# Patient Record
Sex: MALE | Race: WHITE | NOT HISPANIC OR LATINO | URBAN - METROPOLITAN AREA
[De-identification: names, ages, dates, MRNs, and addresses within clinical notes are randomized per-mention and may not be internally consistent; named-entity substitution may affect disease eponyms.]

---

## 2018-05-31 ENCOUNTER — OUTPATIENT (OUTPATIENT)
Dept: OUTPATIENT SERVICES | Facility: HOSPITAL | Age: 73
LOS: 1 days | End: 2018-05-31
Payer: COMMERCIAL

## 2018-05-31 ENCOUNTER — APPOINTMENT (OUTPATIENT)
Dept: RADIOLOGY | Facility: HOSPITAL | Age: 73
End: 2018-05-31
Payer: MEDICARE

## 2018-05-31 DIAGNOSIS — Z00.8 ENCOUNTER FOR OTHER GENERAL EXAMINATION: ICD-10-CM

## 2018-05-31 PROCEDURE — 73562 X-RAY EXAM OF KNEE 3: CPT | Mod: 26,LT

## 2018-05-31 PROCEDURE — 73562 X-RAY EXAM OF KNEE 3: CPT

## 2021-02-04 ENCOUNTER — NON-APPOINTMENT (OUTPATIENT)
Age: 76
End: 2021-02-04

## 2021-02-04 ENCOUNTER — APPOINTMENT (OUTPATIENT)
Dept: CARDIOLOGY | Facility: CLINIC | Age: 76
End: 2021-02-04
Payer: MEDICARE

## 2021-02-04 VITALS
TEMPERATURE: 96.1 F | SYSTOLIC BLOOD PRESSURE: 122 MMHG | HEIGHT: 62 IN | BODY MASS INDEX: 27.42 KG/M2 | DIASTOLIC BLOOD PRESSURE: 79 MMHG | WEIGHT: 149 LBS | RESPIRATION RATE: 16 BRPM | HEART RATE: 71 BPM | OXYGEN SATURATION: 100 %

## 2021-02-04 VITALS — HEART RATE: 64 BPM | DIASTOLIC BLOOD PRESSURE: 80 MMHG | SYSTOLIC BLOOD PRESSURE: 110 MMHG

## 2021-02-04 DIAGNOSIS — E78.5 HYPERLIPIDEMIA, UNSPECIFIED: ICD-10-CM

## 2021-02-04 DIAGNOSIS — I10 ESSENTIAL (PRIMARY) HYPERTENSION: ICD-10-CM

## 2021-02-04 DIAGNOSIS — I49.3 VENTRICULAR PREMATURE DEPOLARIZATION: ICD-10-CM

## 2021-02-04 PROCEDURE — 93306 TTE W/DOPPLER COMPLETE: CPT

## 2021-02-04 PROCEDURE — 99204 OFFICE O/P NEW MOD 45 MIN: CPT

## 2021-02-04 PROCEDURE — 93000 ELECTROCARDIOGRAM COMPLETE: CPT

## 2021-02-04 PROCEDURE — 99072 ADDL SUPL MATRL&STAF TM PHE: CPT

## 2021-02-04 NOTE — REASON FOR VISIT
[Consultation] : a consultation regarding [FreeTextEntry1] : This is a 75-year-old man with a long history of premature ventricular contractions which he says dates back to  when he was hospitalized for diverticulitis. The patient has a history of hypertension. He has a history of hyperlipidemia. He has no history of diabetes he is a nonsmoker He drinks one glass of wine per day and one cup of coffee per day. Most of this and blood work reveals total cholesterol 167 HDL 59 LDL 86. He has a family history of a mother who  from kidney disease at age 53 his father  of an MI at age 53 she has a brother who has a coronary stent. The patient denies chest discomfort shortness of breath palpitations dizziness or syncope.

## 2021-02-04 NOTE — PHYSICAL EXAM
[General Appearance - Well Developed] : well developed [Normal Appearance] : normal appearance [Well Groomed] : well groomed [General Appearance - Well Nourished] : well nourished [No Deformities] : no deformities [General Appearance - In No Acute Distress] : no acute distress [Normal Oral Mucosa] : normal oral mucosa [No Oral Pallor] : no oral pallor [No Oral Cyanosis] : no oral cyanosis [Normal Jugular Venous A Waves Present] : normal jugular venous A waves present [Normal Jugular Venous V Waves Present] : normal jugular venous V waves present [No Jugular Venous Arteaga A Waves] : no jugular venous arteaga A waves [5th Left ICS - MCL] : palpated at the 5th LICS in the midclavicular line [Normal] : normal [Normal Rate] : normal [Premature Beats] : regular with premature beats [No Murmur] : no murmurs heard [Respiration, Rhythm And Depth] : normal respiratory rhythm and effort [Exaggerated Use Of Accessory Muscles For Inspiration] : no accessory muscle use [Auscultation Breath Sounds / Voice Sounds] : lungs were clear to auscultation bilaterally [Abdomen Soft] : soft [Abdomen Tenderness] : non-tender [Abdomen Mass (___ Cm)] : no abdominal mass palpated [Abnormal Walk] : normal gait [Gait - Sufficient For Exercise Testing] : the gait was sufficient for exercise testing [Nail Clubbing] : no clubbing of the fingernails [Cyanosis, Localized] : no localized cyanosis [Petechial Hemorrhages (___cm)] : no petechial hemorrhages [Skin Color & Pigmentation] : normal skin color and pigmentation [] : no rash [No Venous Stasis] : no venous stasis [Skin Lesions] : no skin lesions [No Skin Ulcers] : no skin ulcer [No Xanthoma] : no  xanthoma was observed [Oriented To Time, Place, And Person] : oriented to person, place, and time [Affect] : the affect was normal [Mood] : the mood was normal [No Anxiety] : not feeling anxious

## 2021-02-04 NOTE — ASSESSMENT
[FreeTextEntry1] : In summary, the patient is a 75-year-old man with a history of PVCs and several risk factors for coronary disease.\par \par I have advised him to return for echocardiography to assess for the possibility of an underlying cardiomyopathy.\par \par I've advised him to return for exercise stress testing as well.

## 2021-02-24 ENCOUNTER — APPOINTMENT (OUTPATIENT)
Dept: CARDIOLOGY | Facility: CLINIC | Age: 76
End: 2021-02-24
Payer: MEDICARE

## 2021-02-24 DIAGNOSIS — R94.39 ABNORMAL RESULT OF OTHER CARDIOVASCULAR FUNCTION STUDY: ICD-10-CM

## 2021-02-24 PROCEDURE — 93015 CV STRESS TEST SUPVJ I&R: CPT

## 2021-02-24 PROCEDURE — 99072 ADDL SUPL MATRL&STAF TM PHE: CPT

## 2021-03-04 ENCOUNTER — APPOINTMENT (OUTPATIENT)
Dept: CARDIOLOGY | Facility: CLINIC | Age: 76
End: 2021-03-04

## 2021-04-15 ENCOUNTER — APPOINTMENT (OUTPATIENT)
Dept: CARDIOLOGY | Facility: CLINIC | Age: 76
End: 2021-04-15
Payer: MEDICARE

## 2021-04-15 PROCEDURE — 78452 HT MUSCLE IMAGE SPECT MULT: CPT

## 2021-04-15 PROCEDURE — 93015 CV STRESS TEST SUPVJ I&R: CPT

## 2021-04-15 PROCEDURE — 99072 ADDL SUPL MATRL&STAF TM PHE: CPT

## 2021-04-15 PROCEDURE — A9500: CPT

## 2021-04-16 ENCOUNTER — OUTPATIENT (OUTPATIENT)
Dept: OUTPATIENT SERVICES | Facility: HOSPITAL | Age: 76
LOS: 1 days | End: 2021-04-16
Payer: COMMERCIAL

## 2021-04-16 ENCOUNTER — APPOINTMENT (OUTPATIENT)
Dept: RADIOLOGY | Facility: HOSPITAL | Age: 76
End: 2021-04-16
Payer: MEDICARE

## 2021-04-16 DIAGNOSIS — Z00.8 ENCOUNTER FOR OTHER GENERAL EXAMINATION: ICD-10-CM

## 2021-04-16 PROCEDURE — 73030 X-RAY EXAM OF SHOULDER: CPT

## 2021-04-16 PROCEDURE — 73030 X-RAY EXAM OF SHOULDER: CPT | Mod: 26,RT

## 2021-06-11 ENCOUNTER — APPOINTMENT (OUTPATIENT)
Dept: ORTHOPEDIC SURGERY | Facility: CLINIC | Age: 76
End: 2021-06-11
Payer: MEDICARE

## 2021-06-11 VITALS
WEIGHT: 149 LBS | SYSTOLIC BLOOD PRESSURE: 136 MMHG | DIASTOLIC BLOOD PRESSURE: 96 MMHG | HEIGHT: 64 IN | HEART RATE: 65 BPM | BODY MASS INDEX: 25.44 KG/M2

## 2021-06-11 PROCEDURE — 20611 DRAIN/INJ JOINT/BURSA W/US: CPT | Mod: RT

## 2021-06-11 PROCEDURE — 99204 OFFICE O/P NEW MOD 45 MIN: CPT | Mod: 25

## 2021-06-11 NOTE — ADDENDUM
[FreeTextEntry1] : I, Osorio Valero, acted solely as a scribe for Dr. Orlando Elizabeth on this date 06/11/2021.\par All medical record entries made by the Scribe were at my, Dr. Orlando Elizabeth, direction and personally dictated by me on 06/11/2021. I have reviewed the chart and agree that the record accurately reflects my personal performance of the history, physical exam, assessment and plan. I have also personally directed, reviewed, and agreed with the chart.

## 2021-06-11 NOTE — CONSULT LETTER
[Dear  ___] : Dear  [unfilled], [Consult Letter:] : I had the pleasure of evaluating your patient, [unfilled]. [Please see my note below.] : Please see my note below. [Consult Closing:] : Thank you very much for allowing me to participate in the care of this patient.  If you have any questions, please do not hesitate to contact me. [Sincerely,] : Sincerely, [FreeTextEntry2] : BURT AMARO  [FreeTextEntry3] : Orlando Elizabeth M.D.

## 2021-06-11 NOTE — DISCUSSION/SUMMARY
[de-identified] : I discussed the underlying pathophysiology of the patient's condition in great detail with the patient. I went over the patient's x-rays and MRI with them in great detail. We discussed the use of ice, Tylenol and anti-inflammatories to relieve pain. The patient elected to receive a cortisone injection into his right shoulder, and tolerated it well. I instructed the patient on ROM exercises, and told them to take it easy. The patient may resume activities tomorrow. He should purchase an over-the-door pulley and begin a diligent at-home exercise program. At this time, he will start a course of physical therapy for strengthening and flexibility. A prescription for physical therapy was provided. All of his questions were answered. He understands and consents to the plan.\par \par FU 1 month.\par after a right shoulder intraarticular cortisone injection today (06/11/2021).\par after undergoing PT for his right shoulder.\par after following a diligent HEP with a pulley.

## 2021-06-11 NOTE — HISTORY OF PRESENT ILLNESS
[de-identified] : 75 year old RHD male presents complaining of right shoulder pain and stiffness. It started about 3 months ago. He denies injury but does recall twisting a screwdriver repetitively about 3 months ago. He did feel some discomfort immediately after that. He notes that his shoulder does not bother him when he is sitting at rest. His ROM is quite painful and has decreased significantly. He has occasional pain that radiates down his arm but denies numbness and tingling. He has pain when sleeping on his right side at night. He saw Dr. Hair who sent him for xray and MRI. He has not had treatment.\par \par Xrays done at 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 \par \par MRI of the RIGHT shoulder obtained at [facility] on 5/24/2021: \par 1. AC joint hypertrophy with lateral acromial spur.\par 2. Supraspinatus tendinopathy with 8 mm high-grade tear within the fraying at the insertion. \par 3. Biceps tenosynovitis.\par 4. Capsular thickening anteriorly, which can be seen with adhesive capsulitis.\par 5. Glenohumeral joint effusion.

## 2021-06-11 NOTE — PHYSICAL EXAM
[de-identified] : Constitutional\par o Appearance : well-nourished, well developed, alert, in no acute distress \par Head and Face\par o Head :\par ¦ Inspection : atraumatic, normocephalic\par Neurologic\par o Mental Status Examination :\par ¦ Orientation : alert and oriented X 3\par Psychiatric\par o Mood and Affect: mood normal, affect appropriate \par Cardiovascular\par o Observation/Palpation : - no swelling,normal pulses, normal temperature \par Lymphatic\par o Additional Nodes : No palpable lymph nodes present \par \par Cervical Spine\par o Inspection/Palpation :\par ¦ Inspection : alignment midline, normal degree of lordosis present\par ¦ Skin : normal appearance, no masses or tenderness, trachea midline\par ¦ Palpation : musculature is nontender to palpation\par o Range of Motion : arc of motion full in all planes, no crepitance or pain with ROM\par o Tests: Negative Spurling’s test \par \par Right Upper Extremity\par o Shoulder :\par ¦ Inspection/Palpation : anterior capsular tenderness, no swelling or deformities\par ¦ Range of Motion : forward flexion to 90°, external rotation to 10°, very limited internal rotation, pain at the extremes of motion, no crepitance\par ¦ Strength : forward elevation 4-/5, supraspinatus 4-/5, abduction 4-/5, biceps/triceps 5/5\par ¦ Stability : no joint instability on provocative testing \par ¦ Tests: Galvan negative, Neer negative\par \par Left Upper Extremity\par o Shoulder :\par ¦ Inspection/Palpation : no tenderness, swelling or deformities\par ¦ Range of Motion : full and painless in all planes, no crepitance\par ¦ Strength : forward elevation, internal rotation, external rotation, adduction and abduction 5/5\par ¦ Stability : no joint instability on provocative testing\par  Tests: Galvan negative, Neer negative\par \par Gait: normal gait, no significant extremity swelling or lymphedema \par \par o Right Shoulder: Indication- adhesive capsulitis, Anatomic location right intraarticular glenohumeral joint, Spray-area was sterilized with Betadine and alcohol and anesthetized with Ethyl Chloride , Needle used - 20G, Medications given- 5cc's lidocaine, 0.5cc's kenalog, 0.5cc's dexamethasone under Ultrasound guidance, and patient tolerated it well. He demonstrated significant improvement in ROM and strength immediately after the injection.

## 2021-07-16 ENCOUNTER — APPOINTMENT (OUTPATIENT)
Dept: ORTHOPEDIC SURGERY | Facility: CLINIC | Age: 76
End: 2021-07-16
Payer: MEDICARE

## 2021-07-16 DIAGNOSIS — M19.019 PRIMARY OSTEOARTHRITIS, UNSPECIFIED SHOULDER: ICD-10-CM

## 2021-07-16 DIAGNOSIS — M75.101 UNSPECIFIED ROTATOR CUFF TEAR OR RUPTURE OF RIGHT SHOULDER, NOT SPECIFIED AS TRAUMATIC: ICD-10-CM

## 2021-07-16 DIAGNOSIS — M75.01 ADHESIVE CAPSULITIS OF RIGHT SHOULDER: ICD-10-CM

## 2021-07-16 PROCEDURE — 99213 OFFICE O/P EST LOW 20 MIN: CPT

## 2021-07-16 NOTE — ADDENDUM
[FreeTextEntry1] : I, Osorio Valero, acted solely as a scribe for Dr. Orlando Elizabeth on this date 07/16/2021.\par All medical record entries made by the Scribe were at my, Dr. Orlando Elizabeth, direction and personally dictated by me on 07/16/2021. I have reviewed the chart and agree that the record accurately reflects my personal performance of the history, physical exam, assessment and plan. I have also personally directed, reviewed, and agreed with the chart.

## 2021-07-16 NOTE — DISCUSSION/SUMMARY
[de-identified] : I went over the pathophysiology of the patient's symptoms in great detail with the patient. We discussed the use of ice, Tylenol and anti-inflammatories to relieve pain. At-home strengthening, and stretching exercises were discussed and demonstrated with the patient. He should focus on light weight and high repetition exercises. He should avoid any heavy lifting, carrying, or overhead activities. He should not lift anything higher than 90° past his shoulder level. All of his questions were answered. He understands and consents to the plan.\par \par FU 2-3 months.\par after following a diligent HEP.

## 2021-07-16 NOTE — HISTORY OF PRESENT ILLNESS
[de-identified] : 75 year old RHD male presents complaining of right shoulder pain and stiffness. He received an intraarticular cortisone injection on 6/11/21 and feels that it helped. He also went to 8 PT sessions and was given exercises to do at home. He claims his shoulder is doing much better. 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 \par \par MRI of the RIGHT shoulder obtained at [facility] [place] on 5/24/2021: \par 1. AC joint hypertrophy with lateral acromial spur.\par 2. Supraspinatus tendinopathy with 8 mm high-grade tear within the fraying at the insertion. \par 3. Biceps tenosynovitis.\par 4. Capsular thickening anteriorly, which can be seen with adhesive capsulitis.\par 5. Glenohumeral joint effusion.

## 2021-07-16 NOTE — PHYSICAL EXAM
[de-identified] : Constitutional\par o Appearance : well-nourished, well developed, alert, in no acute distress \par Head and Face\par o Head :\par ¦ Inspection : atraumatic, normocephalic\par Neurologic\par o Mental Status Examination :\par ¦ Orientation : alert and oriented X 3\par Psychiatric\par o Mood and Affect: mood normal, affect appropriate \par Cardiovascular\par o Observation/Palpation : - no swelling,normal pulses, normal temperature \par Lymphatic\par o Additional Nodes : No palpable lymph nodes present \par \par Cervical Spine\par o Inspection/Palpation :\par ¦ Inspection : alignment midline, normal degree of lordosis present\par ¦ Skin : normal appearance, no masses or tenderness, trachea midline\par ¦ Palpation : musculature is nontender to palpation\par o Range of Motion : arc of motion full in all planes, no crepitance or pain with ROM\par o Tests: Negative Spurling’s test \par \par Right Upper Extremity\par o Shoulder :\par ¦ Inspection/Palpation : no anterior capsular or biceps tenderness, no swelling or deformities\par ¦ Range of Motion : full forward flexion without discomfort, slight limitation of internal rotation with slight discomfort at the extreme, full external rotation without discomfort, no crepitance, no discomfort with cross chest maneuvers \par ¦ Strength : forward elevation 4+/5, supraspinatus 4+/5, abduction 4+/5, external rotation at 90° of abduction 4+/5, internal and external rotation 5/5, biceps/triceps 5/5\par ¦ Stability : no joint instability on provocative testing \par ¦ Tests: Galvan negative, Neer negative\par \par Left Upper Extremity\par o Shoulder :\par ¦ Inspection/Palpation : no tenderness, swelling or deformities\par ¦ Range of Motion : full and painless in all planes, no crepitance\par ¦ Strength : forward elevation, internal rotation, external rotation, adduction and abduction 5/5\par ¦ Stability : no joint instability on provocative testing\par ¦ Tests: Galvan negative, Neer negative\par \par Gait: normal gait, no significant extremity swelling or lymphedema

## 2021-10-06 PROBLEM — I10 ESSENTIAL HYPERTENSION: Status: ACTIVE | Noted: 2021-02-04

## 2023-03-09 ENCOUNTER — EMERGENCY (EMERGENCY)
Facility: HOSPITAL | Age: 78
LOS: 1 days | Discharge: HOME | End: 2023-03-09
Attending: EMERGENCY MEDICINE | Admitting: EMERGENCY MEDICINE
Payer: MEDICARE

## 2023-03-09 VITALS
OXYGEN SATURATION: 94 % | SYSTOLIC BLOOD PRESSURE: 121 MMHG | HEART RATE: 85 BPM | WEIGHT: 141.98 LBS | DIASTOLIC BLOOD PRESSURE: 59 MMHG | RESPIRATION RATE: 18 BRPM | TEMPERATURE: 98 F | HEIGHT: 64 IN

## 2023-03-09 VITALS — HEART RATE: 81 BPM | DIASTOLIC BLOOD PRESSURE: 70 MMHG | SYSTOLIC BLOOD PRESSURE: 132 MMHG

## 2023-03-09 LAB
ALBUMIN SERPL ELPH-MCNC: 3.2 G/DL — LOW (ref 3.3–5)
ALP SERPL-CCNC: 62 U/L — SIGNIFICANT CHANGE UP (ref 40–120)
ALT FLD-CCNC: 59 U/L — HIGH (ref 10–45)
ANION GAP SERPL CALC-SCNC: 7 MMOL/L — SIGNIFICANT CHANGE UP (ref 5–17)
APPEARANCE UR: CLEAR — SIGNIFICANT CHANGE UP
APTT BLD: 29.3 SEC — SIGNIFICANT CHANGE UP (ref 27.5–35.5)
AST SERPL-CCNC: 54 U/L — HIGH (ref 10–40)
BACTERIA # UR AUTO: NEGATIVE /HPF — SIGNIFICANT CHANGE UP
BASOPHILS # BLD AUTO: 0.03 K/UL — SIGNIFICANT CHANGE UP (ref 0–0.2)
BASOPHILS NFR BLD AUTO: 0.2 % — SIGNIFICANT CHANGE UP (ref 0–2)
BILIRUB SERPL-MCNC: 0.7 MG/DL — SIGNIFICANT CHANGE UP (ref 0.2–1.2)
BILIRUB UR-MCNC: NEGATIVE — SIGNIFICANT CHANGE UP
BUN SERPL-MCNC: 25 MG/DL — HIGH (ref 7–23)
CALCIUM SERPL-MCNC: 9.1 MG/DL — SIGNIFICANT CHANGE UP (ref 8.4–10.5)
CHLORIDE SERPL-SCNC: 100 MMOL/L — SIGNIFICANT CHANGE UP (ref 96–108)
CO2 SERPL-SCNC: 29 MMOL/L — SIGNIFICANT CHANGE UP (ref 22–31)
COLOR SPEC: YELLOW — SIGNIFICANT CHANGE UP
CREAT SERPL-MCNC: 1.09 MG/DL — SIGNIFICANT CHANGE UP (ref 0.5–1.3)
DIFF PNL FLD: ABNORMAL
EGFR: 70 ML/MIN/1.73M2 — SIGNIFICANT CHANGE UP
EOSINOPHIL # BLD AUTO: 0.01 K/UL — SIGNIFICANT CHANGE UP (ref 0–0.5)
EOSINOPHIL NFR BLD AUTO: 0.1 % — SIGNIFICANT CHANGE UP (ref 0–6)
EPI CELLS # UR: SIGNIFICANT CHANGE UP
FLUAV AG NPH QL: SIGNIFICANT CHANGE UP
FLUBV AG NPH QL: SIGNIFICANT CHANGE UP
GLUCOSE SERPL-MCNC: 115 MG/DL — HIGH (ref 70–99)
GLUCOSE UR QL: NEGATIVE — SIGNIFICANT CHANGE UP
HCT VFR BLD CALC: 38.1 % — LOW (ref 39–50)
HGB BLD-MCNC: 13.2 G/DL — SIGNIFICANT CHANGE UP (ref 13–17)
IMM GRANULOCYTES NFR BLD AUTO: 0.6 % — SIGNIFICANT CHANGE UP (ref 0–0.9)
INR BLD: 1.23 RATIO — HIGH (ref 0.88–1.16)
KETONES UR-MCNC: NEGATIVE — SIGNIFICANT CHANGE UP
LACTATE SERPL-SCNC: 1.4 MMOL/L — SIGNIFICANT CHANGE UP (ref 0.7–2)
LEUKOCYTE ESTERASE UR-ACNC: NEGATIVE — SIGNIFICANT CHANGE UP
LYMPHOCYTES # BLD AUTO: 1.22 K/UL — SIGNIFICANT CHANGE UP (ref 1–3.3)
LYMPHOCYTES # BLD AUTO: 10 % — LOW (ref 13–44)
MCHC RBC-ENTMCNC: 31.1 PG — SIGNIFICANT CHANGE UP (ref 27–34)
MCHC RBC-ENTMCNC: 34.6 GM/DL — SIGNIFICANT CHANGE UP (ref 32–36)
MCV RBC AUTO: 89.9 FL — SIGNIFICANT CHANGE UP (ref 80–100)
MONOCYTES # BLD AUTO: 0.7 K/UL — SIGNIFICANT CHANGE UP (ref 0–0.9)
MONOCYTES NFR BLD AUTO: 5.7 % — SIGNIFICANT CHANGE UP (ref 2–14)
NEUTROPHILS # BLD AUTO: 10.19 K/UL — HIGH (ref 1.8–7.4)
NEUTROPHILS NFR BLD AUTO: 83.4 % — HIGH (ref 43–77)
NITRITE UR-MCNC: NEGATIVE — SIGNIFICANT CHANGE UP
NRBC # BLD: 0 /100 WBCS — SIGNIFICANT CHANGE UP (ref 0–0)
PH UR: 5 — SIGNIFICANT CHANGE UP (ref 5–8)
PLATELET # BLD AUTO: 131 K/UL — LOW (ref 150–400)
POTASSIUM SERPL-MCNC: 3.4 MMOL/L — LOW (ref 3.5–5.3)
POTASSIUM SERPL-SCNC: 3.4 MMOL/L — LOW (ref 3.5–5.3)
PROT SERPL-MCNC: 6.9 G/DL — SIGNIFICANT CHANGE UP (ref 6–8.3)
PROT UR-MCNC: NEGATIVE — SIGNIFICANT CHANGE UP
PROTHROM AB SERPL-ACNC: 14.3 SEC — HIGH (ref 10.5–13.4)
RBC # BLD: 4.24 M/UL — SIGNIFICANT CHANGE UP (ref 4.2–5.8)
RBC # FLD: 13.3 % — SIGNIFICANT CHANGE UP (ref 10.3–14.5)
RBC CASTS # UR COMP ASSIST: SIGNIFICANT CHANGE UP /HPF (ref 0–4)
RSV RNA NPH QL NAA+NON-PROBE: SIGNIFICANT CHANGE UP
SARS-COV-2 RNA SPEC QL NAA+PROBE: SIGNIFICANT CHANGE UP
SODIUM SERPL-SCNC: 136 MMOL/L — SIGNIFICANT CHANGE UP (ref 135–145)
SP GR SPEC: 1 — LOW (ref 1.01–1.02)
UROBILINOGEN FLD QL: NEGATIVE — SIGNIFICANT CHANGE UP
WBC # BLD: 12.22 K/UL — HIGH (ref 3.8–10.5)
WBC # FLD AUTO: 12.22 K/UL — HIGH (ref 3.8–10.5)
WBC UR QL: NEGATIVE /HPF — SIGNIFICANT CHANGE UP (ref 0–5)

## 2023-03-09 PROCEDURE — 85610 PROTHROMBIN TIME: CPT

## 2023-03-09 PROCEDURE — 81001 URINALYSIS AUTO W/SCOPE: CPT

## 2023-03-09 PROCEDURE — 85025 COMPLETE CBC W/AUTO DIFF WBC: CPT

## 2023-03-09 PROCEDURE — 83605 ASSAY OF LACTIC ACID: CPT

## 2023-03-09 PROCEDURE — 87637 SARSCOV2&INF A&B&RSV AMP PRB: CPT

## 2023-03-09 PROCEDURE — 93010 ELECTROCARDIOGRAM REPORT: CPT

## 2023-03-09 PROCEDURE — 85730 THROMBOPLASTIN TIME PARTIAL: CPT

## 2023-03-09 PROCEDURE — 87086 URINE CULTURE/COLONY COUNT: CPT

## 2023-03-09 PROCEDURE — 71045 X-RAY EXAM CHEST 1 VIEW: CPT

## 2023-03-09 PROCEDURE — 87040 BLOOD CULTURE FOR BACTERIA: CPT

## 2023-03-09 PROCEDURE — 96360 HYDRATION IV INFUSION INIT: CPT

## 2023-03-09 PROCEDURE — 99285 EMERGENCY DEPT VISIT HI MDM: CPT | Mod: 25

## 2023-03-09 PROCEDURE — 99285 EMERGENCY DEPT VISIT HI MDM: CPT

## 2023-03-09 PROCEDURE — 36415 COLL VENOUS BLD VENIPUNCTURE: CPT

## 2023-03-09 PROCEDURE — 93005 ELECTROCARDIOGRAM TRACING: CPT

## 2023-03-09 PROCEDURE — 71045 X-RAY EXAM CHEST 1 VIEW: CPT | Mod: 26

## 2023-03-09 PROCEDURE — 80053 COMPREHEN METABOLIC PANEL: CPT

## 2023-03-09 RX ORDER — SODIUM CHLORIDE 9 MG/ML
2000 INJECTION INTRAMUSCULAR; INTRAVENOUS; SUBCUTANEOUS ONCE
Refills: 0 | Status: COMPLETED | OUTPATIENT
Start: 2023-03-09 | End: 2023-03-09

## 2023-03-09 RX ADMIN — SODIUM CHLORIDE 2000 MILLILITER(S): 9 INJECTION INTRAMUSCULAR; INTRAVENOUS; SUBCUTANEOUS at 10:22

## 2023-03-09 NOTE — ED PROVIDER NOTE - PATIENT PORTAL LINK FT
You can access the FollowMyHealth Patient Portal offered by [facility] by registering at the following website: [email or web address]. By joining [facility]’s FollowMyHealth portal, you will also be able to view your health information using other applications (apps) compatible with our system.

## 2023-03-09 NOTE — ED PROVIDER NOTE - CARE PLAN
Principal Discharge DX:	Hypotension   1 Principal Discharge DX:	Hypotension  Secondary Diagnosis:	Weakness

## 2023-03-09 NOTE — ED ADULT NURSE NOTE - OBJECTIVE STATEMENT
77 yr old male to ED for complaints of hypotension and fevers. Patient states he has experiencing fevers, chills and preformed a covid home test but was negative. Patient denies nausea, vomiting, chest pain, shortness of breath.

## 2023-03-09 NOTE — ED PROVIDER NOTE - CLINICAL SUMMARY MEDICAL DECISION MAKING FREE TEXT BOX
77 yr old male with hx of HTN, HLD presents c/o low blood pressure since yesterday, with associated fever ( tmax 102.4), chills, headache, bodyaches. Pt took Tylenol at 9 am. Pt denies any abd pain, n/v/d, chest pain, sob or any other symptoms. Pt reports not taking his daily am BP meds this am.    well appearing, clear lungs, abdomen soft non tender   sepsis labs, ivf ordered 77 yr old male with hx of HTN, HLD presents c/o low blood pressure since yesterday, with associated fever ( tmax 102.4), chills, headache, bodyaches. Pt took Tylenol at 9 am. Pt denies any abd pain, n/v/d, chest pain, sob or any other symptoms. Pt reports not taking his daily am BP meds this am.    well appearing, clear lungs, abdomen soft non tender   sepsis labs, ivf ordered   labs and imaging all reviewed. no fever in ED   pt eating and ambulating in ED with no difficulty   pt stable for dc and to follow up with pmd.

## 2023-03-09 NOTE — ED ADULT TRIAGE NOTE - CHIEF COMPLAINT QUOTE
pt c/o low blood pressure since yesterday and headache. Pt on BP medications and did not take them today. Per wife, pt also had a fever last night tmax 102.4, pt took tylenol this morning

## 2023-03-09 NOTE — ED PROVIDER NOTE - OBJECTIVE STATEMENT
77 yr old male with hx of HTN, HLD presents c/o low blood pressure since yesterday, with associated fever ( tmax 102.4), chills, headache, bodyaches. Pt took Tylenol at 9 am. Pt denies any abd pain, n/v/d, chest pain, sob or any other symptoms. Pt reports not taking his daily am BP meds this am.

## 2023-03-09 NOTE — ED PROVIDER NOTE - NS ED ATTENDING STATEMENT MOD
This was a shared visit with the NICHOLE. I reviewed and verified the documentation and independently performed the documented:

## 2023-03-09 NOTE — ED PROVIDER NOTE - NSFOLLOWUPINSTRUCTIONS_ED_ALL_ED_FT
Follow up with your pmd within 48 hours- show copies of ER results   Do not take blood pressure medications until follow up with pmd   Drink plenty of fluids   Return to the ED if any worsening or persistent symptoms         Hypotension      As your heart beats, it forces blood through your body. This force is called blood pressure. If you have hypotension, you have low blood pressure.     When your blood pressure is too low, you may not get enough blood to your brain or other parts of your body. This may cause you to feel weak, light-headed, have a fast heartbeat, or even faint. Low blood pressure may be harmless, or it may cause serious problems.      What are the causes?    •Blood loss.      •Not enough water in the body (dehydration).      •Heart problems.      •Hormone problems.      •Pregnancy.      •A very bad infection.      •Not having enough of certain nutrients.      •Very bad allergic reactions.      •Certain medicines.        What increases the risk?    •Age. The risk increases as you get older.      •Conditions that affect the heart or the brain and spinal cord (central nervous system).        What are the signs or symptoms?  •Feeling:  •Weak.      •Light-headed.      •Dizzy.      •Tired (fatigued).        •Blurred vision.      •Fast heartbeat.      •Fainting, in very bad cases.        How is this treated?    •Changing your diet. This may involve drinking more water or including more salt (sodium) in your diet by eating high-salt foods.      •Taking medicines to raise your blood pressure.      •Changing how much you take (the dosage) of some of your medicines.      •Wearing compression stockings. These stockings help to prevent blood clots and reduce swelling in your legs.      In some cases, you may need to go to the hospital to:  •Receive fluids through an IV tube.      •Receive donated blood through an IV tube (transfusion).      •Get treated for an infection or heart problems, if this applies.      •Be monitored while medicines that you are taking wear off.        Follow these instructions at home:      Eating and drinking   A plate with examples of foods in a healthy diet.   •Drink enough fluids to keep your pee (urine) pale yellow.    •Eat a healthy diet. Follow instructions from your doctor about what you can eat or drink. A healthy diet includes:  •Fresh fruits and vegetables.      •Whole grains.      •Low-fat (lean) meats.      •Low-fat dairy products.        •If told, include more salt in your diet. Do not add extra salt to your diet unless your doctor tells you to.      •Eat small meals often.      •Avoid standing up quickly after you eat.      Medicines   •Take over-the-counter and prescription medicines only as told by your doctor.  •Follow instructions from your doctor about changing how much you take of your medicines, if this applies.      •Do not stop or change any of your medicines on your own.          General instructions   Compression stockings on a person's lower legs.   •Wear compression stockings as told by your doctor.      •Get up slowly from lying down or sitting.      •Avoid hot showers and a lot of heat as told by your doctor.      •Return to your normal activities when your doctor says that it is safe.      • Do not smoke or use any products that contain nicotine or tobacco. If you need help quitting, ask your doctor.      •Keep all follow-up visits.        Contact a doctor if:    •You vomit.      •You have watery poop (diarrhea).      •You have a fever for more than 2–3 days.      •You feel more thirsty than normal.      •You feel weak and tired.        Get help right away if:    •You have chest pain.      •You have a fast or uneven heartbeat.      •You lose feeling (have numbness) in any part of your body.      •You cannot move your arms or your legs.      •You have trouble talking.      •You get sweaty or feel light-headed.      •You faint.      •You have trouble breathing.      •You have trouble staying awake.      •You feel mixed up (confused).      These symptoms may be an emergency. Get help right away. Call 911.   • Do not wait to see if the symptoms will go away.        •  Do not drive yourself to the hospital.         Summary    •Hypotension is also called low blood pressure. It is when the force of blood pumping through your body is too weak.      •Hypotension may be harmless, or it may cause serious problems.      •Treatment may include changing your diet and medicines, and wearing compression stockings.      •In very bad cases, you may need to go to the hospital.      This information is not intended to replace advice given to you by your health care provider. Make sure you discuss any questions you have with your health care provider.      Document Revised: 08/08/2022 Document Reviewed: 08/08/2022    Elsevier Patient Education © 2023 [employer]

## 2023-03-09 NOTE — ED PROVIDER NOTE - ATTENDING APP SHARED VISIT CONTRIBUTION OF CARE
Sherry with KURTIS maki. 77 yr old male with hx of HTN, HLD presents c/o low blood pressure since yesterday, with associated fever ( tmax 102.4), chills, headache, bodyaches. Pt took Tylenol at 9 am. Pt denies any abd pain, n/v/d, chest pain, sob or any other symptoms. Pt reports not taking his daily am BP meds this am or yesterday.   well appearing, clear lungs, abdomen soft non tender   sepsis labs, ivf ordered  Pt states he feels better today. Will f/u results and trend BP readings. Reassess.     I performed a face to face bedside interview with patient regarding history of present illness, review of symptoms and past medical history. I completed an independent physical exam.  I have discussed the patient's plan of care with Physician Assistant (PA). I agree with note as stated above, having amended the EMR as needed to reflect my findings.   This includes History of Present Illness, HIV, Past Medical/Surgical/Family/Social History, Allergies and Home Medications, Review of Systems, Physical Exam, and any Progress Notes during the time I functioned as the attending physician for this patient.

## 2023-03-10 LAB
CULTURE RESULTS: SIGNIFICANT CHANGE UP
SPECIMEN SOURCE: SIGNIFICANT CHANGE UP

## 2023-03-13 NOTE — CHART NOTE - NSCHARTNOTEFT_GEN_A_CORE
SW placed call to patient to discuss and assist with follow up care.  Patient presented to ED on 3/9/23 due to hypotension.  Patient reports he followed up with PMD Dr Keating on 3/10/23.  Patient declined needing any other SW assistance at this time.  Patient encouraged to call ED SW if further SW assistance is needed.
SW met with the pt at bedside to assess for social work needs and discuss home assessment of safety.  Pt is a 78 y/o male presented to ED for Hypotension.    Emergency Department Social Work Geriatric Initial Assessment    Cognitive Mental Status -Alert and oriented, able to communicate and comprehend well.  Primary Caregiver Information –wife, Mrs Magana [phone]  Emergency Contact Information –As above  Primary Care Physician / Specialists -Angel Beltre   Functional Status Prior to ED Visit - Fully independent in all areas of activities.  Family and Social Support –Family is supportive as needed.  Living Arrangement - Private house and living with his wife   Services Present on Admission –   Assistive Devices (Durable Medical Equipment) –None  ADLs & Degree of [place] – Ambulatory and independent  Barriers to obtain medications -None  Barriers to attend medical appointments -None   ISAR Score – 1  Advanced Directives – No   Follow up care – Pt wants schedule himself, declined SW assistance with it.  Discharge Transportation – Family   Summary/Recommendations/Referrals -Pt lives with his wife in a private house, feels safe at home and has no safety concerns, Sw did not identify any other areas of concerns, encouraged pt to call SW if further assistance is needed.

## 2023-03-14 LAB
CULTURE RESULTS: SIGNIFICANT CHANGE UP
CULTURE RESULTS: SIGNIFICANT CHANGE UP
SPECIMEN SOURCE: SIGNIFICANT CHANGE UP
SPECIMEN SOURCE: SIGNIFICANT CHANGE UP

## 2023-08-22 ENCOUNTER — INPATIENT (INPATIENT)
Facility: HOSPITAL | Age: 78
LOS: 2 days | Discharge: HOME | DRG: 872 | End: 2023-08-25
Attending: HOSPITALIST | Admitting: INTERNAL MEDICINE
Payer: MEDICARE

## 2023-08-22 ENCOUNTER — EMERGENCY (EMERGENCY)
Facility: HOSPITAL | Age: 78
LOS: 1 days | Discharge: HOME | End: 2023-08-22
Attending: EMERGENCY MEDICINE | Admitting: EMERGENCY MEDICINE
Payer: MEDICARE

## 2023-08-22 VITALS
SYSTOLIC BLOOD PRESSURE: 100 MMHG | HEART RATE: 89 BPM | RESPIRATION RATE: 17 BRPM | DIASTOLIC BLOOD PRESSURE: 57 MMHG | OXYGEN SATURATION: 98 %

## 2023-08-22 VITALS
OXYGEN SATURATION: 95 % | HEART RATE: 124 BPM | SYSTOLIC BLOOD PRESSURE: 127 MMHG | DIASTOLIC BLOOD PRESSURE: 70 MMHG | TEMPERATURE: 98 F | WEIGHT: 141.98 LBS | RESPIRATION RATE: 18 BRPM | HEIGHT: 63 IN

## 2023-08-22 VITALS
HEART RATE: 67 BPM | RESPIRATION RATE: 19 BRPM | HEIGHT: 63 IN | DIASTOLIC BLOOD PRESSURE: 65 MMHG | SYSTOLIC BLOOD PRESSURE: 101 MMHG | OXYGEN SATURATION: 97 % | TEMPERATURE: 98 F | WEIGHT: 141.98 LBS

## 2023-08-22 PROBLEM — E78.5 HYPERLIPIDEMIA, UNSPECIFIED: Chronic | Status: ACTIVE | Noted: 2023-03-09

## 2023-08-22 PROBLEM — I10 ESSENTIAL (PRIMARY) HYPERTENSION: Chronic | Status: ACTIVE | Noted: 2023-03-09

## 2023-08-22 LAB
ALBUMIN SERPL ELPH-MCNC: 3.7 G/DL — SIGNIFICANT CHANGE UP (ref 3.3–5)
ALP SERPL-CCNC: 70 U/L — SIGNIFICANT CHANGE UP (ref 40–120)
ALT FLD-CCNC: 19 U/L — SIGNIFICANT CHANGE UP (ref 10–45)
ANION GAP SERPL CALC-SCNC: 13 MMOL/L — SIGNIFICANT CHANGE UP (ref 5–17)
APPEARANCE UR: CLEAR — SIGNIFICANT CHANGE UP
APTT BLD: 24.4 SEC — LOW (ref 24.5–35.6)
AST SERPL-CCNC: 18 U/L — SIGNIFICANT CHANGE UP (ref 10–40)
BACTERIA # UR AUTO: NEGATIVE /HPF — SIGNIFICANT CHANGE UP
BASOPHILS # BLD AUTO: 0.01 K/UL — SIGNIFICANT CHANGE UP (ref 0–0.2)
BASOPHILS NFR BLD AUTO: 0.2 % — SIGNIFICANT CHANGE UP (ref 0–2)
BILIRUB SERPL-MCNC: 0.9 MG/DL — SIGNIFICANT CHANGE UP (ref 0.2–1.2)
BILIRUB UR-MCNC: NEGATIVE — SIGNIFICANT CHANGE UP
BUN SERPL-MCNC: 18 MG/DL — SIGNIFICANT CHANGE UP (ref 7–23)
CALCIUM SERPL-MCNC: 9.4 MG/DL — SIGNIFICANT CHANGE UP (ref 8.4–10.5)
CHLORIDE SERPL-SCNC: 101 MMOL/L — SIGNIFICANT CHANGE UP (ref 96–108)
CO2 SERPL-SCNC: 27 MMOL/L — SIGNIFICANT CHANGE UP (ref 22–31)
COLOR SPEC: YELLOW — SIGNIFICANT CHANGE UP
COMMENT - URINE: SIGNIFICANT CHANGE UP
CREAT SERPL-MCNC: 1.16 MG/DL — SIGNIFICANT CHANGE UP (ref 0.5–1.3)
DIFF PNL FLD: ABNORMAL
E COLI DNA BLD POS QL NAA+NON-PROBE: SIGNIFICANT CHANGE UP
EGFR: 65 ML/MIN/1.73M2 — SIGNIFICANT CHANGE UP
EOSINOPHIL # BLD AUTO: 0.04 K/UL — SIGNIFICANT CHANGE UP (ref 0–0.5)
EOSINOPHIL NFR BLD AUTO: 0.8 % — SIGNIFICANT CHANGE UP (ref 0–6)
EPI CELLS # UR: 0 — SIGNIFICANT CHANGE UP
GLUCOSE SERPL-MCNC: 98 MG/DL — SIGNIFICANT CHANGE UP (ref 70–99)
GLUCOSE UR QL: NEGATIVE MG/DL — SIGNIFICANT CHANGE UP
GRAM STN FLD: SIGNIFICANT CHANGE UP
HCT VFR BLD CALC: 41.8 % — SIGNIFICANT CHANGE UP (ref 39–50)
HGB BLD-MCNC: 14.2 G/DL — SIGNIFICANT CHANGE UP (ref 13–17)
IMM GRANULOCYTES NFR BLD AUTO: 0.2 % — SIGNIFICANT CHANGE UP (ref 0–0.9)
INR BLD: 0.95 RATIO — SIGNIFICANT CHANGE UP (ref 0.85–1.18)
K OXYTOCA DNA BLD POS QL NAA+NON-PROBE: SIGNIFICANT CHANGE UP
KETONES UR-MCNC: NEGATIVE MG/DL — SIGNIFICANT CHANGE UP
LACTATE SERPL-SCNC: 1.2 MMOL/L — SIGNIFICANT CHANGE UP (ref 0.7–2)
LACTATE SERPL-SCNC: 6.5 MMOL/L — CRITICAL HIGH (ref 0.7–2)
LEUKOCYTE ESTERASE UR-ACNC: NEGATIVE — SIGNIFICANT CHANGE UP
LIDOCAIN IGE QN: 43 U/L — LOW (ref 73–393)
LYMPHOCYTES # BLD AUTO: 0.82 K/UL — LOW (ref 1–3.3)
LYMPHOCYTES # BLD AUTO: 16.6 % — SIGNIFICANT CHANGE UP (ref 13–44)
MCHC RBC-ENTMCNC: 30.9 PG — SIGNIFICANT CHANGE UP (ref 27–34)
MCHC RBC-ENTMCNC: 34 GM/DL — SIGNIFICANT CHANGE UP (ref 32–36)
MCV RBC AUTO: 91.1 FL — SIGNIFICANT CHANGE UP (ref 80–100)
METHOD TYPE: SIGNIFICANT CHANGE UP
MONOCYTES # BLD AUTO: 0.03 K/UL — SIGNIFICANT CHANGE UP (ref 0–0.9)
MONOCYTES NFR BLD AUTO: 0.6 % — LOW (ref 2–14)
NEUTROPHILS # BLD AUTO: 4.03 K/UL — SIGNIFICANT CHANGE UP (ref 1.8–7.4)
NEUTROPHILS NFR BLD AUTO: 81.6 % — HIGH (ref 43–77)
NITRITE UR-MCNC: NEGATIVE — SIGNIFICANT CHANGE UP
NRBC # BLD: 0 /100 WBCS — SIGNIFICANT CHANGE UP (ref 0–0)
PH UR: 7 — SIGNIFICANT CHANGE UP (ref 5–8)
PLATELET # BLD AUTO: 145 K/UL — LOW (ref 150–400)
POTASSIUM SERPL-MCNC: 3.2 MMOL/L — LOW (ref 3.5–5.3)
POTASSIUM SERPL-SCNC: 3.2 MMOL/L — LOW (ref 3.5–5.3)
PROT SERPL-MCNC: 7.9 G/DL — SIGNIFICANT CHANGE UP (ref 6–8.3)
PROT UR-MCNC: NEGATIVE MG/DL — SIGNIFICANT CHANGE UP
PROTHROM AB SERPL-ACNC: 10.9 SEC — SIGNIFICANT CHANGE UP (ref 9.5–13)
RAPID RVP RESULT: SIGNIFICANT CHANGE UP
RBC # BLD: 4.59 M/UL — SIGNIFICANT CHANGE UP (ref 4.2–5.8)
RBC # FLD: 13.1 % — SIGNIFICANT CHANGE UP (ref 10.3–14.5)
RBC CASTS # UR COMP ASSIST: 1 /HPF — SIGNIFICANT CHANGE UP (ref 0–4)
SARS-COV-2 RNA SPEC QL NAA+PROBE: SIGNIFICANT CHANGE UP
SODIUM SERPL-SCNC: 141 MMOL/L — SIGNIFICANT CHANGE UP (ref 135–145)
SP GR SPEC: 1.01 — SIGNIFICANT CHANGE UP (ref 1–1.03)
SPECIMEN SOURCE: SIGNIFICANT CHANGE UP
SPECIMEN SOURCE: SIGNIFICANT CHANGE UP
UROBILINOGEN FLD QL: 0.2 MG/DL — SIGNIFICANT CHANGE UP (ref 0.2–1)
WBC # BLD: 4.94 K/UL — SIGNIFICANT CHANGE UP (ref 3.8–10.5)
WBC # FLD AUTO: 4.94 K/UL — SIGNIFICANT CHANGE UP (ref 3.8–10.5)
WBC UR QL: 0 /HPF — SIGNIFICANT CHANGE UP (ref 0–5)

## 2023-08-22 PROCEDURE — 0225U NFCT DS DNA&RNA 21 SARSCOV2: CPT

## 2023-08-22 PROCEDURE — G1004: CPT

## 2023-08-22 PROCEDURE — 96375 TX/PRO/DX INJ NEW DRUG ADDON: CPT

## 2023-08-22 PROCEDURE — 87086 URINE CULTURE/COLONY COUNT: CPT

## 2023-08-22 PROCEDURE — 87150 DNA/RNA AMPLIFIED PROBE: CPT

## 2023-08-22 PROCEDURE — 71250 CT THORAX DX C-: CPT | Mod: ME

## 2023-08-22 PROCEDURE — 99285 EMERGENCY DEPT VISIT HI MDM: CPT | Mod: 25

## 2023-08-22 PROCEDURE — 96365 THER/PROPH/DIAG IV INF INIT: CPT

## 2023-08-22 PROCEDURE — 87077 CULTURE AEROBIC IDENTIFY: CPT

## 2023-08-22 PROCEDURE — 99053 MED SERV 10PM-8AM 24 HR FAC: CPT

## 2023-08-22 PROCEDURE — 99285 EMERGENCY DEPT VISIT HI MDM: CPT

## 2023-08-22 PROCEDURE — 96361 HYDRATE IV INFUSION ADD-ON: CPT

## 2023-08-22 PROCEDURE — 71250 CT THORAX DX C-: CPT | Mod: 26,ME

## 2023-08-22 PROCEDURE — 36415 COLL VENOUS BLD VENIPUNCTURE: CPT

## 2023-08-22 PROCEDURE — 87040 BLOOD CULTURE FOR BACTERIA: CPT

## 2023-08-22 PROCEDURE — 81001 URINALYSIS AUTO W/SCOPE: CPT

## 2023-08-22 PROCEDURE — 93005 ELECTROCARDIOGRAM TRACING: CPT

## 2023-08-22 PROCEDURE — 85610 PROTHROMBIN TIME: CPT

## 2023-08-22 PROCEDURE — 71045 X-RAY EXAM CHEST 1 VIEW: CPT | Mod: 26

## 2023-08-22 PROCEDURE — 93010 ELECTROCARDIOGRAM REPORT: CPT

## 2023-08-22 PROCEDURE — 85025 COMPLETE CBC W/AUTO DIFF WBC: CPT

## 2023-08-22 PROCEDURE — 85730 THROMBOPLASTIN TIME PARTIAL: CPT

## 2023-08-22 PROCEDURE — 80053 COMPREHEN METABOLIC PANEL: CPT

## 2023-08-22 PROCEDURE — 83690 ASSAY OF LIPASE: CPT

## 2023-08-22 PROCEDURE — 87186 SC STD MICRODIL/AGAR DIL: CPT

## 2023-08-22 PROCEDURE — 71045 X-RAY EXAM CHEST 1 VIEW: CPT

## 2023-08-22 PROCEDURE — 96368 THER/DIAG CONCURRENT INF: CPT

## 2023-08-22 PROCEDURE — 83605 ASSAY OF LACTIC ACID: CPT

## 2023-08-22 PROCEDURE — 99291 CRITICAL CARE FIRST HOUR: CPT

## 2023-08-22 RX ORDER — ONDANSETRON 8 MG/1
4 TABLET, FILM COATED ORAL ONCE
Refills: 0 | Status: COMPLETED | OUTPATIENT
Start: 2023-08-22 | End: 2023-08-22

## 2023-08-22 RX ORDER — FAMOTIDINE 10 MG/ML
20 INJECTION INTRAVENOUS ONCE
Refills: 0 | Status: COMPLETED | OUTPATIENT
Start: 2023-08-22 | End: 2023-08-22

## 2023-08-22 RX ORDER — ACETAMINOPHEN 500 MG
1000 TABLET ORAL ONCE
Refills: 0 | Status: COMPLETED | OUTPATIENT
Start: 2023-08-22 | End: 2023-08-22

## 2023-08-22 RX ORDER — OLMESARTAN MEDOXOMIL-HYDROCHLOROTHIAZIDE 25; 40 MG/1; MG/1
1 TABLET, FILM COATED ORAL
Qty: 0 | Refills: 0 | DISCHARGE

## 2023-08-22 RX ORDER — CEFTRIAXONE 500 MG/1
1000 INJECTION, POWDER, FOR SOLUTION INTRAMUSCULAR; INTRAVENOUS ONCE
Refills: 0 | Status: COMPLETED | OUTPATIENT
Start: 2023-08-22 | End: 2023-08-22

## 2023-08-22 RX ORDER — ASPIRIN/CALCIUM CARB/MAGNESIUM 324 MG
1 TABLET ORAL
Qty: 0 | Refills: 0 | DISCHARGE

## 2023-08-22 RX ORDER — SODIUM CHLORIDE 9 MG/ML
2000 INJECTION INTRAMUSCULAR; INTRAVENOUS; SUBCUTANEOUS ONCE
Refills: 0 | Status: COMPLETED | OUTPATIENT
Start: 2023-08-22 | End: 2023-08-22

## 2023-08-22 RX ORDER — SODIUM CHLORIDE 9 MG/ML
2100 INJECTION INTRAMUSCULAR; INTRAVENOUS; SUBCUTANEOUS ONCE
Refills: 0 | Status: COMPLETED | OUTPATIENT
Start: 2023-08-22 | End: 2023-08-22

## 2023-08-22 RX ORDER — FAMOTIDINE 10 MG/ML
20 INJECTION INTRAVENOUS ONCE
Refills: 0 | Status: DISCONTINUED | OUTPATIENT
Start: 2023-08-22 | End: 2023-08-22

## 2023-08-22 RX ORDER — CHOLECALCIFEROL (VITAMIN D3) 125 MCG
1 CAPSULE ORAL
Qty: 0 | Refills: 0 | DISCHARGE

## 2023-08-22 RX ORDER — SODIUM CHLORIDE 9 MG/ML
1000 INJECTION INTRAMUSCULAR; INTRAVENOUS; SUBCUTANEOUS ONCE
Refills: 0 | Status: DISCONTINUED | OUTPATIENT
Start: 2023-08-22 | End: 2023-08-22

## 2023-08-22 RX ADMIN — CEFTRIAXONE 100 MILLIGRAM(S): 500 INJECTION, POWDER, FOR SOLUTION INTRAMUSCULAR; INTRAVENOUS at 06:46

## 2023-08-22 RX ADMIN — FAMOTIDINE 20 MILLIGRAM(S): 10 INJECTION INTRAVENOUS at 06:47

## 2023-08-22 RX ADMIN — Medication 1000 MILLIGRAM(S): at 06:17

## 2023-08-22 RX ADMIN — FAMOTIDINE 100 MILLIGRAM(S): 10 INJECTION INTRAVENOUS at 06:17

## 2023-08-22 RX ADMIN — ONDANSETRON 4 MILLIGRAM(S): 8 TABLET, FILM COATED ORAL at 06:17

## 2023-08-22 RX ADMIN — SODIUM CHLORIDE 2100 MILLILITER(S): 9 INJECTION INTRAMUSCULAR; INTRAVENOUS; SUBCUTANEOUS at 06:05

## 2023-08-22 RX ADMIN — SODIUM CHLORIDE 2100 MILLILITER(S): 9 INJECTION INTRAMUSCULAR; INTRAVENOUS; SUBCUTANEOUS at 08:00

## 2023-08-22 RX ADMIN — Medication 400 MILLIGRAM(S): at 06:05

## 2023-08-22 RX ADMIN — CEFTRIAXONE 100 MILLIGRAM(S): 500 INJECTION, POWDER, FOR SOLUTION INTRAMUSCULAR; INTRAVENOUS at 23:52

## 2023-08-22 RX ADMIN — SODIUM CHLORIDE 2000 MILLILITER(S): 9 INJECTION INTRAMUSCULAR; INTRAVENOUS; SUBCUTANEOUS at 23:52

## 2023-08-22 RX ADMIN — Medication 1000 MILLIGRAM(S): at 07:00

## 2023-08-22 RX ADMIN — CEFTRIAXONE 1000 MILLIGRAM(S): 500 INJECTION, POWDER, FOR SOLUTION INTRAMUSCULAR; INTRAVENOUS at 07:16

## 2023-08-22 NOTE — ED PROVIDER NOTE - OBJECTIVE STATEMENT
77-year-old male with past medical history hypertension hyperlipidemia presenting to the ED with complaints of tactile fever chills since yesterday morning, patient denies any reports of dysuria urgency or frequency denies any flank pain or back pain, denies any hematuria no reported chest pain shortness of breath or abdominal pain.,  Patient was recently seen in the ED and had elevated lactate negative RVP lactate was elevated with repeat normalized blood cultures positive for gram-negative rods called back to the ED.

## 2023-08-22 NOTE — ED PROVIDER NOTE - CLINICAL SUMMARY MEDICAL DECISION MAKING FREE TEXT BOX
77-year-old male with past medical history hypertension hyperlipidemia presenting to the ED with complaints of tactile fever chills since yesterday morning, patient denies any reports of dysuria urgency or frequency denies any flank pain or back pain, denies any hematuria no reported chest pain shortness of breath or abdominal pain.,  Patient was recently seen in the ED and had elevated lactate negative RVP lactate was elevated with repeat normalized blood cultures positive for gram-negative rods called back to the ED. 77-year-old male with past medical history hypertension hyperlipidemia presenting to the ED with complaints of tactile fever chills since yesterday morning, patient denies any reports of dysuria urgency or frequency denies any flank pain or back pain, denies any hematuria no reported chest pain shortness of breath or abdominal pain.,  Patient was recently seen in the ED and had elevated lactate negative RVP lactate was elevated with repeat normalized blood cultures positive for gram-negative rods called back to the ED.    + blood cultures  probable urinary source  admit to medicine   ceftriaxone, IVF, lactate 1.2

## 2023-08-22 NOTE — ED PROVIDER NOTE - OBJECTIVE STATEMENT
76 yo male pw fever, n/v. 76 yo male pw fever, chills and nausea with myalgias, pt had same in march 2023 with t max 102.4, no source was found and pt went home to fu with pcp Dr. Keating.

## 2023-08-22 NOTE — ED ADULT NURSE NOTE - NSFALLRISKFACTORS_ED_ALL_ED
weakness/Bone Condition: Including osteoporosis, prolonged steroid use or metastatic bone disease/cancer/Other

## 2023-08-22 NOTE — ED ADULT NURSE NOTE - OBJECTIVE STATEMENT
Presents to ED from home c/o waking up shaking with chills with associated nausea. Pt. also reports b/l leg pain as well.

## 2023-08-22 NOTE — ED PROVIDER NOTE - PHYSICAL EXAMINATION
Gen:  ill appearing with dipahoresis and shaking chills  Head:  NC/AT  HEENT: pupils perrl,no pharyngeal erythema, uvula midline  Cardiac: S1S2, RRR  Abd: Soft, non tender  Resp: No distress, CTA   musculoskeletal:: no deformities, no swelling, strength +5/+5  Skin: warm and dry as visualized, no rashes  Neuro: YUEN, aao x 4  Psych:alert, cooperative, appropriate mood and affect for situation

## 2023-08-22 NOTE — ED PROVIDER NOTE - CRITICAL CARE ATTENDING CONTRIBUTION TO CARE
direct patient care (not related to procedure), additional history taking, interpretation of diagnostic studies, documentation, consultation with other physicians, consult w/ pt's family directly relating to pts condition  B Angie HENDRICKS

## 2023-08-22 NOTE — ED POST DISCHARGE NOTE - DETAILS
patient contacted and states she will return to ED - will sign out to oncoming attending if patient does not return during my shift

## 2023-08-22 NOTE — ED PROVIDER NOTE - CLINICAL SUMMARY MEDICAL DECISION MAKING FREE TEXT BOX
pt with fever and chills, will get labs including lactate, hydrate, ofirmev for fever as pt with nausea, fluids, ekg, rvp

## 2023-08-22 NOTE — ED ADULT NURSE NOTE - NSFALLHARMRISKINTERV_ED_ALL_ED

## 2023-08-22 NOTE — ED PROVIDER NOTE - PATIENT PORTAL LINK FT
You can access the FollowMyHealth Patient Portal offered by [facility] by registering at the following website: [email or web address]. By joining [facility]’s FollowMyHealth portal, you will also be able to view your health information using other applications (apps) compatible with our system.

## 2023-08-22 NOTE — ED PROVIDER NOTE - PROGRESS NOTE DETAILS
to so am doctor pt with fever and chills, urine pending, repeat lactate pending after hydration SILVER Adams DO pt reevaluted, feeling better, no findings for source of fever, lactate improved, follow up with pmd, return if any symptoms worsen

## 2023-08-23 DIAGNOSIS — R78.81 BACTEREMIA: ICD-10-CM

## 2023-08-23 LAB
ALBUMIN SERPL ELPH-MCNC: 2.3 G/DL — LOW (ref 3.3–5)
ALBUMIN SERPL ELPH-MCNC: 2.8 G/DL — LOW (ref 3.3–5)
ALP SERPL-CCNC: 49 U/L — SIGNIFICANT CHANGE UP (ref 40–120)
ALP SERPL-CCNC: 51 U/L — SIGNIFICANT CHANGE UP (ref 40–120)
ALT FLD-CCNC: 37 U/L — SIGNIFICANT CHANGE UP (ref 10–45)
ALT FLD-CCNC: 46 U/L — HIGH (ref 10–45)
ANION GAP SERPL CALC-SCNC: 7 MMOL/L — SIGNIFICANT CHANGE UP (ref 5–17)
ANION GAP SERPL CALC-SCNC: 8 MMOL/L — SIGNIFICANT CHANGE UP (ref 5–17)
APTT BLD: 29.2 SEC — SIGNIFICANT CHANGE UP (ref 24.5–35.6)
AST SERPL-CCNC: 34 U/L — SIGNIFICANT CHANGE UP (ref 10–40)
AST SERPL-CCNC: 44 U/L — HIGH (ref 10–40)
BASOPHILS # BLD AUTO: 0.02 K/UL — SIGNIFICANT CHANGE UP (ref 0–0.2)
BASOPHILS # BLD AUTO: 0.04 K/UL — SIGNIFICANT CHANGE UP (ref 0–0.2)
BASOPHILS NFR BLD AUTO: 0.2 % — SIGNIFICANT CHANGE UP (ref 0–2)
BASOPHILS NFR BLD AUTO: 0.3 % — SIGNIFICANT CHANGE UP (ref 0–2)
BILIRUB DIRECT SERPL-MCNC: 0.2 MG/DL — SIGNIFICANT CHANGE UP (ref 0–0.3)
BILIRUB INDIRECT FLD-MCNC: 0.3 MG/DL — SIGNIFICANT CHANGE UP (ref 0.2–1)
BILIRUB SERPL-MCNC: 0.5 MG/DL — SIGNIFICANT CHANGE UP (ref 0.2–1.2)
BILIRUB SERPL-MCNC: 0.8 MG/DL — SIGNIFICANT CHANGE UP (ref 0.2–1.2)
BUN SERPL-MCNC: 15 MG/DL — SIGNIFICANT CHANGE UP (ref 7–23)
BUN SERPL-MCNC: 18 MG/DL — SIGNIFICANT CHANGE UP (ref 7–23)
CALCIUM SERPL-MCNC: 7.5 MG/DL — LOW (ref 8.4–10.5)
CALCIUM SERPL-MCNC: 8.2 MG/DL — LOW (ref 8.4–10.5)
CHLORIDE SERPL-SCNC: 103 MMOL/L — SIGNIFICANT CHANGE UP (ref 96–108)
CHLORIDE SERPL-SCNC: 106 MMOL/L — SIGNIFICANT CHANGE UP (ref 96–108)
CO2 SERPL-SCNC: 25 MMOL/L — SIGNIFICANT CHANGE UP (ref 22–31)
CO2 SERPL-SCNC: 26 MMOL/L — SIGNIFICANT CHANGE UP (ref 22–31)
CREAT SERPL-MCNC: 0.95 MG/DL — SIGNIFICANT CHANGE UP (ref 0.5–1.3)
CREAT SERPL-MCNC: 0.95 MG/DL — SIGNIFICANT CHANGE UP (ref 0.5–1.3)
CULTURE RESULTS: NO GROWTH — SIGNIFICANT CHANGE UP
EGFR: 82 ML/MIN/1.73M2 — SIGNIFICANT CHANGE UP
EGFR: 83 ML/MIN/1.73M2 — SIGNIFICANT CHANGE UP
EOSINOPHIL # BLD AUTO: 0 K/UL — SIGNIFICANT CHANGE UP (ref 0–0.5)
EOSINOPHIL # BLD AUTO: 0.01 K/UL — SIGNIFICANT CHANGE UP (ref 0–0.5)
EOSINOPHIL NFR BLD AUTO: 0 % — SIGNIFICANT CHANGE UP (ref 0–6)
EOSINOPHIL NFR BLD AUTO: 0.1 % — SIGNIFICANT CHANGE UP (ref 0–6)
GLUCOSE SERPL-MCNC: 104 MG/DL — HIGH (ref 70–99)
GLUCOSE SERPL-MCNC: 94 MG/DL — SIGNIFICANT CHANGE UP (ref 70–99)
HCT VFR BLD CALC: 30.7 % — LOW (ref 39–50)
HCT VFR BLD CALC: 33.6 % — LOW (ref 39–50)
HCV AB S/CO SERPL IA: 0.06 S/CO — SIGNIFICANT CHANGE UP (ref 0–0.99)
HCV AB SERPL-IMP: SIGNIFICANT CHANGE UP
HGB BLD-MCNC: 10.5 G/DL — LOW (ref 13–17)
HGB BLD-MCNC: 11.5 G/DL — LOW (ref 13–17)
IMM GRANULOCYTES NFR BLD AUTO: 0.6 % — SIGNIFICANT CHANGE UP (ref 0–0.9)
IMM GRANULOCYTES NFR BLD AUTO: 0.6 % — SIGNIFICANT CHANGE UP (ref 0–0.9)
INR BLD: 1.2 RATIO — HIGH (ref 0.85–1.18)
LACTATE SERPL-SCNC: 1.2 MMOL/L — SIGNIFICANT CHANGE UP (ref 0.7–2)
LYMPHOCYTES # BLD AUTO: 0.65 K/UL — LOW (ref 1–3.3)
LYMPHOCYTES # BLD AUTO: 0.72 K/UL — LOW (ref 1–3.3)
LYMPHOCYTES # BLD AUTO: 4.4 % — LOW (ref 13–44)
LYMPHOCYTES # BLD AUTO: 6 % — LOW (ref 13–44)
MAGNESIUM SERPL-MCNC: 1.4 MG/DL — LOW (ref 1.6–2.6)
MAGNESIUM SERPL-MCNC: 1.5 MG/DL — LOW (ref 1.6–2.6)
MCHC RBC-ENTMCNC: 31 PG — SIGNIFICANT CHANGE UP (ref 27–34)
MCHC RBC-ENTMCNC: 31.1 PG — SIGNIFICANT CHANGE UP (ref 27–34)
MCHC RBC-ENTMCNC: 34.2 GM/DL — SIGNIFICANT CHANGE UP (ref 32–36)
MCHC RBC-ENTMCNC: 34.2 GM/DL — SIGNIFICANT CHANGE UP (ref 32–36)
MCV RBC AUTO: 90.6 FL — SIGNIFICANT CHANGE UP (ref 80–100)
MCV RBC AUTO: 90.8 FL — SIGNIFICANT CHANGE UP (ref 80–100)
MONOCYTES # BLD AUTO: 0.44 K/UL — SIGNIFICANT CHANGE UP (ref 0–0.9)
MONOCYTES # BLD AUTO: 0.49 K/UL — SIGNIFICANT CHANGE UP (ref 0–0.9)
MONOCYTES NFR BLD AUTO: 3 % — SIGNIFICANT CHANGE UP (ref 2–14)
MONOCYTES NFR BLD AUTO: 4.1 % — SIGNIFICANT CHANGE UP (ref 2–14)
NEUTROPHILS # BLD AUTO: 10.64 K/UL — HIGH (ref 1.8–7.4)
NEUTROPHILS # BLD AUTO: 13.56 K/UL — HIGH (ref 1.8–7.4)
NEUTROPHILS NFR BLD AUTO: 89.1 % — HIGH (ref 43–77)
NEUTROPHILS NFR BLD AUTO: 91.6 % — HIGH (ref 43–77)
NRBC # BLD: 0 /100 WBCS — SIGNIFICANT CHANGE UP (ref 0–0)
NRBC # BLD: 0 /100 WBCS — SIGNIFICANT CHANGE UP (ref 0–0)
PLATELET # BLD AUTO: 109 K/UL — LOW (ref 150–400)
PLATELET # BLD AUTO: 91 K/UL — LOW (ref 150–400)
POTASSIUM SERPL-MCNC: 3.2 MMOL/L — LOW (ref 3.5–5.3)
POTASSIUM SERPL-MCNC: 3.8 MMOL/L — SIGNIFICANT CHANGE UP (ref 3.5–5.3)
POTASSIUM SERPL-SCNC: 3.2 MMOL/L — LOW (ref 3.5–5.3)
POTASSIUM SERPL-SCNC: 3.8 MMOL/L — SIGNIFICANT CHANGE UP (ref 3.5–5.3)
PROT SERPL-MCNC: 5 G/DL — LOW (ref 6–8.3)
PROT SERPL-MCNC: 6 G/DL — SIGNIFICANT CHANGE UP (ref 6–8.3)
PROTHROM AB SERPL-ACNC: 13.6 SEC — HIGH (ref 9.5–13)
RBC # BLD: 3.38 M/UL — LOW (ref 4.2–5.8)
RBC # BLD: 3.71 M/UL — LOW (ref 4.2–5.8)
RBC # FLD: 13.5 % — SIGNIFICANT CHANGE UP (ref 10.3–14.5)
RBC # FLD: 13.7 % — SIGNIFICANT CHANGE UP (ref 10.3–14.5)
SODIUM SERPL-SCNC: 137 MMOL/L — SIGNIFICANT CHANGE UP (ref 135–145)
SODIUM SERPL-SCNC: 138 MMOL/L — SIGNIFICANT CHANGE UP (ref 135–145)
SPECIMEN SOURCE: SIGNIFICANT CHANGE UP
WBC # BLD: 11.94 K/UL — HIGH (ref 3.8–10.5)
WBC # BLD: 14.79 K/UL — HIGH (ref 3.8–10.5)
WBC # FLD AUTO: 11.94 K/UL — HIGH (ref 3.8–10.5)
WBC # FLD AUTO: 14.79 K/UL — HIGH (ref 3.8–10.5)

## 2023-08-23 PROCEDURE — 99233 SBSQ HOSP IP/OBS HIGH 50: CPT

## 2023-08-23 PROCEDURE — 76705 ECHO EXAM OF ABDOMEN: CPT | Mod: 26

## 2023-08-23 PROCEDURE — 99222 1ST HOSP IP/OBS MODERATE 55: CPT

## 2023-08-23 PROCEDURE — 93010 ELECTROCARDIOGRAM REPORT: CPT

## 2023-08-23 PROCEDURE — 74177 CT ABD & PELVIS W/CONTRAST: CPT | Mod: 26

## 2023-08-23 PROCEDURE — 78226 HEPATOBILIARY SYSTEM IMAGING: CPT | Mod: 26

## 2023-08-23 RX ORDER — MULTIVIT-MIN/FERROUS GLUCONATE 9 MG/15 ML
1 LIQUID (ML) ORAL
Qty: 0 | Refills: 0 | DISCHARGE

## 2023-08-23 RX ORDER — IRBESARTAN AND HYDROCHLOROTHIAZIDE 12.5; 3 MG/1; MG/1
1 TABLET ORAL
Refills: 0 | DISCHARGE

## 2023-08-23 RX ORDER — ATORVASTATIN CALCIUM 80 MG/1
40 TABLET, FILM COATED ORAL AT BEDTIME
Refills: 0 | Status: DISCONTINUED | OUTPATIENT
Start: 2023-08-23 | End: 2023-08-25

## 2023-08-23 RX ORDER — SODIUM CHLORIDE 9 MG/ML
1000 INJECTION, SOLUTION INTRAVENOUS
Refills: 0 | Status: COMPLETED | OUTPATIENT
Start: 2023-08-23 | End: 2023-08-23

## 2023-08-23 RX ORDER — ASPIRIN/CALCIUM CARB/MAGNESIUM 324 MG
1 TABLET ORAL
Refills: 0 | DISCHARGE

## 2023-08-23 RX ORDER — ENOXAPARIN SODIUM 100 MG/ML
40 INJECTION SUBCUTANEOUS EVERY 24 HOURS
Refills: 0 | Status: DISCONTINUED | OUTPATIENT
Start: 2023-08-23 | End: 2023-08-25

## 2023-08-23 RX ORDER — CEFTRIAXONE 500 MG/1
2000 INJECTION, POWDER, FOR SOLUTION INTRAMUSCULAR; INTRAVENOUS EVERY 24 HOURS
Refills: 0 | Status: DISCONTINUED | OUTPATIENT
Start: 2023-08-24 | End: 2023-08-25

## 2023-08-23 RX ORDER — ROSUVASTATIN CALCIUM 5 MG/1
1 TABLET ORAL
Qty: 0 | Refills: 0 | DISCHARGE

## 2023-08-23 RX ORDER — POTASSIUM CHLORIDE 20 MEQ
40 PACKET (EA) ORAL ONCE
Refills: 0 | Status: COMPLETED | OUTPATIENT
Start: 2023-08-23 | End: 2023-08-23

## 2023-08-23 RX ORDER — GENTAMICIN SULFATE 40 MG/ML
300 VIAL (ML) INJECTION ONCE
Refills: 0 | Status: COMPLETED | OUTPATIENT
Start: 2023-08-23 | End: 2023-08-23

## 2023-08-23 RX ORDER — CEFTRIAXONE 500 MG/1
1000 INJECTION, POWDER, FOR SOLUTION INTRAMUSCULAR; INTRAVENOUS ONCE
Refills: 0 | Status: COMPLETED | OUTPATIENT
Start: 2023-08-23 | End: 2023-08-23

## 2023-08-23 RX ORDER — ASPIRIN/CALCIUM CARB/MAGNESIUM 324 MG
81 TABLET ORAL DAILY
Refills: 0 | Status: DISCONTINUED | OUTPATIENT
Start: 2023-08-23 | End: 2023-08-25

## 2023-08-23 RX ORDER — FAMOTIDINE 10 MG/ML
20 INJECTION INTRAVENOUS DAILY
Refills: 0 | Status: DISCONTINUED | OUTPATIENT
Start: 2023-08-23 | End: 2023-08-24

## 2023-08-23 RX ORDER — MAGNESIUM OXIDE 400 MG ORAL TABLET 241.3 MG
800 TABLET ORAL ONCE
Refills: 0 | Status: COMPLETED | OUTPATIENT
Start: 2023-08-23 | End: 2023-08-23

## 2023-08-23 RX ORDER — SODIUM CHLORIDE 9 MG/ML
1000 INJECTION, SOLUTION INTRAVENOUS
Refills: 0 | Status: COMPLETED | OUTPATIENT
Start: 2023-08-23 | End: 2023-08-24

## 2023-08-23 RX ORDER — ACETAMINOPHEN 500 MG
650 TABLET ORAL EVERY 6 HOURS
Refills: 0 | Status: DISCONTINUED | OUTPATIENT
Start: 2023-08-23 | End: 2023-08-25

## 2023-08-23 RX ORDER — ONDANSETRON 8 MG/1
4 TABLET, FILM COATED ORAL EVERY 6 HOURS
Refills: 0 | Status: DISCONTINUED | OUTPATIENT
Start: 2023-08-23 | End: 2023-08-25

## 2023-08-23 RX ADMIN — CEFTRIAXONE 1000 MILLIGRAM(S): 500 INJECTION, POWDER, FOR SOLUTION INTRAMUSCULAR; INTRAVENOUS at 01:06

## 2023-08-23 RX ADMIN — Medication 81 MILLIGRAM(S): at 11:56

## 2023-08-23 RX ADMIN — ENOXAPARIN SODIUM 40 MILLIGRAM(S): 100 INJECTION SUBCUTANEOUS at 11:56

## 2023-08-23 RX ADMIN — Medication 40 MILLIEQUIVALENT(S): at 01:19

## 2023-08-23 RX ADMIN — SODIUM CHLORIDE 2000 MILLILITER(S): 9 INJECTION INTRAMUSCULAR; INTRAVENOUS; SUBCUTANEOUS at 01:06

## 2023-08-23 RX ADMIN — SODIUM CHLORIDE 1000 MILLILITER(S): 9 INJECTION, SOLUTION INTRAVENOUS at 01:38

## 2023-08-23 RX ADMIN — FAMOTIDINE 100 MILLIGRAM(S): 10 INJECTION INTRAVENOUS at 11:56

## 2023-08-23 RX ADMIN — MAGNESIUM OXIDE 400 MG ORAL TABLET 800 MILLIGRAM(S): 241.3 TABLET ORAL at 01:56

## 2023-08-23 RX ADMIN — Medication 100 MILLIGRAM(S): at 02:50

## 2023-08-23 RX ADMIN — CEFTRIAXONE 100 MILLIGRAM(S): 500 INJECTION, POWDER, FOR SOLUTION INTRAMUSCULAR; INTRAVENOUS at 01:38

## 2023-08-23 NOTE — PROGRESS NOTE ADULT - SUBJECTIVE AND OBJECTIVE BOX
The HIDA scan appears to be normal - no acute cholecystitis    PLAN: If official report is normal HIDA, the patient can be started on a liquid diet

## 2023-08-23 NOTE — PROGRESS NOTE ADULT - ASSESSMENT
76 y/o M with PMH HTN, HLD c/o fever, chills x 2 days presented to ER, received cftx x1 with improvement, discharged home and referred back for evaluation due to blood cultures positive 4/4 for gram neg rods; admitted for bacteremia, unknown source.     Gram negative Sepsis/bacteremia, likely  vs GI/biliary source  UTI vs cholecystitis (pt, however w/ no abd/nor flank pain)  Mild transaminitis, thrombocytopenia from sepsis  Hypokalemia, hypomagnesemia  Pt w/hx of HTN (was on thiazide diuretic), HLD  -Continue tele monitoring  -Continue empiric abx - cftx and gentamycin   -ID consulted  -Follow final blood cx   -IV hydration, replete K, replete Mg  -Surgery appreciated - will obtain urgent HIDA suspect acute nolan  -NPO    HTN  -Will hold ARB/Thiazide - pt is hypotensive    HLD  -Cont Statin    GI/DVT prophylaxis    Wife, son, and daughter updated at bedside  Primary contact daughter Fay

## 2023-08-23 NOTE — ED ADULT NURSE REASSESSMENT NOTE - NS ED NURSE REASSESS COMMENT FT1
pt requested to ambulate, pt ambulating around unit with no complaints at this time. will continue to monitor closely.

## 2023-08-23 NOTE — PROGRESS NOTE ADULT - SUBJECTIVE AND OBJECTIVE BOX
Patient is a 77y old  Male who presents with a chief complaint of gram neg bacteremia - sepsis (23 Aug 2023 08:30)      Patient seen and examined at bedside. c/o tactile fevers, improving since admission. chills improved. denies headache, fever, chills, cp, sob, n/v. +mild generalized abd pain.     ALLERGIES:  Dilaudid (Unknown)  morphine (Unknown)    MEDICATIONS  (STANDING):  aspirin enteric coated 81 milliGRAM(s) Oral daily  atorvastatin 40 milliGRAM(s) Oral at bedtime  enoxaparin Injectable 40 milliGRAM(s) SubCutaneous every 24 hours  famotidine  IVPB 20 milliGRAM(s) IV Intermittent daily  lactated ringers. 1000 milliLiter(s) (125 mL/Hr) IV Continuous <Continuous>    MEDICATIONS  (PRN):  acetaminophen     Tablet .. 650 milliGRAM(s) Oral every 6 hours PRN Temp greater or equal to 38C (100.4F), Mild Pain (1 - 3)  ondansetron Injectable 4 milliGRAM(s) IV Push every 6 hours PRN Nausea and/or Vomiting    Vital Signs Last 24 Hrs  T(F): 98.7 (23 Aug 2023 08:47), Max: 98.7 (22 Aug 2023 11:00)  HR: 82 (23 Aug 2023 08:47) (66 - 99)  BP: 114/61 (23 Aug 2023 08:47) (84/67 - 114/61)  RR: 16 (23 Aug 2023 08:47) (14 - 24)  SpO2: 93% (23 Aug 2023 08:47) (93% - 99%)  I&O's Summary    BMI (kg/m2): 25.1 (08-22-23 @ 23:20), 25.2 (08-22-23 @ 05:39)  PHYSICAL EXAM:  General: NAD, A/O x 3  ENT: MMM, no scleral icterus  Neck: Supple, No JVD  Lungs: Clear to auscultation bilaterally, no wheezes, rales, rhonchi  Cardio: RRR, S1/S2  Abdomen: Soft, Nontender, Nondistended; Bowel sounds present  Extremities: No calf tenderness, No pitting edema    LABS:                        10.5   11.94 )-----------( 91       ( 23 Aug 2023 05:50 )             30.7       08-23    138  |  106  |  15  ----------------------------<  94  3.8   |  25  |  0.95    Ca    7.5      23 Aug 2023 05:50  Mg     1.5     08-23    TPro  5.0  /  Alb  2.3  /  TBili  0.5  /  DBili  0.2  /  AST  34  /  ALT  37  /  AlkPhos  49  08-23       PT/INR - ( 22 Aug 2023 23:23 )   PT: 13.6 sec;   INR: 1.20 ratio         PTT - ( 22 Aug 2023 23:23 )  PTT:29.2 sec   Lactate, Blood: 1.2 mmol/L (08-22 @ 23:23)  Lactate, Blood: 1.2 mmol/L (08-22 @ 08:30)  Lactate, Blood: 6.5 mmol/L (08-22 @ 05:30)                          Urinalysis Basic - ( 23 Aug 2023 05:50 )    Color: x / Appearance: x / SG: x / pH: x  Gluc: 94 mg/dL / Ketone: x  / Bili: x / Urobili: x   Blood: x / Protein: x / Nitrite: x   Leuk Esterase: x / RBC: x / WBC x   Sq Epi: x / Non Sq Epi: x / Bacteria: x        Culture - Urine (collected 22 Aug 2023 06:50)  Source: Clean Catch Clean Catch (Midstream)  Final Report (23 Aug 2023 06:56):    No growth    Culture - Blood (collected 22 Aug 2023 05:30)  Source: .Blood Blood-Peripheral  Gram Stain (22 Aug 2023 22:22):    Growth in aerobic bottle: Gram Negative Rods    Growth in anaerobic bottle: Gram Negative Rods  Preliminary Report (22 Aug 2023 22:22):    Growth in aerobic bottle: Gram Negative Rods    Growth in anaerobic bottle: Gram Negative Rods    Culture - Blood (collected 22 Aug 2023 05:30)  Source: .Blood Blood-Peripheral  Gram Stain (22 Aug 2023 22:43):    Growth in aerobic bottle: Gram Negative Rods    Growth in anaerobic bottle: Gram Negative Rods  Preliminary Report (22 Aug 2023 22:43):    Growth in aerobic bottle: Gram Negative Rods    Growth in anaerobic bottle: Gram Negative Rods    Direct identification is available within approximately 3-5    hours either by Blood Panel Multiplexed PCR or Direct    MALDI-TOF. Details: [email or web address]  Organism: Blood Culture PCR (22 Aug 2023 23:21)  Organism: Blood Culture PCR (22 Aug 2023 23:21)      -  Klebsiella oxytoca: Detec      -  Escherichia coli: Detec      Method Type: PCR          RADIOLOGY & ADDITIONAL TESTS:    Care Discussed with Consultants/Other Providers:    Patient is a 77y old  Male who presents with a chief complaint of gram neg bacteremia - sepsis (23 Aug 2023 08:30)      Patient seen and examined at bedside. c/o tactile fevers, improving since admission. chills improved. denies headache, fever, chills, cp, sob, n/v. +mild generalized abd pain.     ALLERGIES:  Dilaudid (Unknown)  morphine (Unknown)    MEDICATIONS  (STANDING):  aspirin enteric coated 81 milliGRAM(s) Oral daily  atorvastatin 40 milliGRAM(s) Oral at bedtime  enoxaparin Injectable 40 milliGRAM(s) SubCutaneous every 24 hours  famotidine  IVPB 20 milliGRAM(s) IV Intermittent daily  lactated ringers. 1000 milliLiter(s) (125 mL/Hr) IV Continuous <Continuous>    MEDICATIONS  (PRN):  acetaminophen     Tablet .. 650 milliGRAM(s) Oral every 6 hours PRN Temp greater or equal to 38C (100.4F), Mild Pain (1 - 3)  ondansetron Injectable 4 milliGRAM(s) IV Push every 6 hours PRN Nausea and/or Vomiting    Vital Signs Last 24 Hrs  T(F): 98.7 (23 Aug 2023 08:47), Max: 98.7 (22 Aug 2023 11:00)  HR: 82 (23 Aug 2023 08:47) (66 - 99)  BP: 114/61 (23 Aug 2023 08:47) (84/67 - 114/61)  RR: 16 (23 Aug 2023 08:47) (14 - 24)  SpO2: 93% (23 Aug 2023 08:47) (93% - 99%)  I&O's Summary    BMI (kg/m2): 25.1 (08-22-23 @ 23:20), 25.2 (08-22-23 @ 05:39)  PHYSICAL EXAM:  General: NAD, A/O x 3  ENT: MMM, no scleral icterus  Neck: Supple, No JVD  Lungs: Clear to auscultation bilaterally, no wheezes, rales, rhonchi  Cardio: RRR, S1/S2  Abdomen: Soft, Nondistended, +mild diffuse abd ttp; Bowel sounds present  Extremities: No calf tenderness, No pitting edema    LABS:                        10.5   11.94 )-----------( 91       ( 23 Aug 2023 05:50 )             30.7       08-23    138  |  106  |  15  ----------------------------<  94  3.8   |  25  |  0.95    Ca    7.5      23 Aug 2023 05:50  Mg     1.5     08-23    TPro  5.0  /  Alb  2.3  /  TBili  0.5  /  DBili  0.2  /  AST  34  /  ALT  37  /  AlkPhos  49  08-23       PT/INR - ( 22 Aug 2023 23:23 )   PT: 13.6 sec;   INR: 1.20 ratio         PTT - ( 22 Aug 2023 23:23 )  PTT:29.2 sec   Lactate, Blood: 1.2 mmol/L (08-22 @ 23:23)  Lactate, Blood: 1.2 mmol/L (08-22 @ 08:30)  Lactate, Blood: 6.5 mmol/L (08-22 @ 05:30)                          Urinalysis Basic - ( 23 Aug 2023 05:50 )    Color: x / Appearance: x / SG: x / pH: x  Gluc: 94 mg/dL / Ketone: x  / Bili: x / Urobili: x   Blood: x / Protein: x / Nitrite: x   Leuk Esterase: x / RBC: x / WBC x   Sq Epi: x / Non Sq Epi: x / Bacteria: x        Culture - Urine (collected 22 Aug 2023 06:50)  Source: Clean Catch Clean Catch (Midstream)  Final Report (23 Aug 2023 06:56):    No growth    Culture - Blood (collected 22 Aug 2023 05:30)  Source: .Blood Blood-Peripheral  Gram Stain (22 Aug 2023 22:22):    Growth in aerobic bottle: Gram Negative Rods    Growth in anaerobic bottle: Gram Negative Rods  Preliminary Report (22 Aug 2023 22:22):    Growth in aerobic bottle: Gram Negative Rods    Growth in anaerobic bottle: Gram Negative Rods    Culture - Blood (collected 22 Aug 2023 05:30)  Source: .Blood Blood-Peripheral  Gram Stain (22 Aug 2023 22:43):    Growth in aerobic bottle: Gram Negative Rods    Growth in anaerobic bottle: Gram Negative Rods  Preliminary Report (22 Aug 2023 22:43):    Growth in aerobic bottle: Gram Negative Rods    Growth in anaerobic bottle: Gram Negative Rods    Direct identification is available within approximately 3-5    hours either by Blood Panel Multiplexed PCR or Direct    MALDI-TOF. Details: [email or web address]  Organism: Blood Culture PCR (22 Aug 2023 23:21)  Organism: Blood Culture PCR (22 Aug 2023 23:21)      -  Klebsiella oxytoca: Detec      -  Escherichia coli: Detec      Method Type: PCR          RADIOLOGY & ADDITIONAL TESTS:    Care Discussed with Consultants/Other Providers:    Patient is a 77y old  Male who presents with a chief complaint of gram neg bacteremia - sepsis (23 Aug 2023 08:30)      Patient seen and examined at bedside. c/o tactile fevers, improving since admission. chills improved. denies headache, fever, chills, cp, sob, n/v. +mild generalized abd pain.     ALLERGIES:  Dilaudid (Unknown)  morphine (Unknown)    MEDICATIONS  (STANDING):  aspirin enteric coated 81 milliGRAM(s) Oral daily  atorvastatin 40 milliGRAM(s) Oral at bedtime  enoxaparin Injectable 40 milliGRAM(s) SubCutaneous every 24 hours  famotidine  IVPB 20 milliGRAM(s) IV Intermittent daily  lactated ringers. 1000 milliLiter(s) (125 mL/Hr) IV Continuous <Continuous>    MEDICATIONS  (PRN):  acetaminophen     Tablet .. 650 milliGRAM(s) Oral every 6 hours PRN Temp greater or equal to 38C (100.4F), Mild Pain (1 - 3)  ondansetron Injectable 4 milliGRAM(s) IV Push every 6 hours PRN Nausea and/or Vomiting    Vital Signs Last 24 Hrs  T(F): 98.7 (23 Aug 2023 08:47), Max: 98.7 (22 Aug 2023 11:00)  HR: 82 (23 Aug 2023 08:47) (66 - 99)  BP: 114/61 (23 Aug 2023 08:47) (84/67 - 114/61)  RR: 16 (23 Aug 2023 08:47) (14 - 24)  SpO2: 93% (23 Aug 2023 08:47) (93% - 99%)  I&O's Summary    BMI (kg/m2): 25.1 (08-22-23 @ 23:20), 25.2 (08-22-23 @ 05:39)  PHYSICAL EXAM:  General: NAD, A/O x 3  ENT: MMM, no scleral icterus  Neck: Supple, No JVD  Lungs: Clear to auscultation bilaterally, no wheezes, rales, rhonchi  Cardio: RRR, S1/S2  Abdomen: Soft, Nondistended, +mild diffuse abd ttp; Bowel sounds present  Extremities: No calf tenderness, No pitting edema    LABS:                        10.5   11.94 )-----------( 91       ( 23 Aug 2023 05:50 )             30.7       08-23    138  |  106  |  15  ----------------------------<  94  3.8   |  25  |  0.95    Ca    7.5      23 Aug 2023 05:50  Mg     1.5     08-23    TPro  5.0  /  Alb  2.3  /  TBili  0.5  /  DBili  0.2  /  AST  34  /  ALT  37  /  AlkPhos  49  08-23       PT/INR - ( 22 Aug 2023 23:23 )   PT: 13.6 sec;   INR: 1.20 ratio         PTT - ( 22 Aug 2023 23:23 )  PTT:29.2 sec   Lactate, Blood: 1.2 mmol/L (08-22 @ 23:23)  Lactate, Blood: 1.2 mmol/L (08-22 @ 08:30)  Lactate, Blood: 6.5 mmol/L (08-22 @ 05:30)                          Urinalysis Basic - ( 23 Aug 2023 05:50 )    Color: x / Appearance: x / SG: x / pH: x  Gluc: 94 mg/dL / Ketone: x  / Bili: x / Urobili: x   Blood: x / Protein: x / Nitrite: x   Leuk Esterase: x / RBC: x / WBC x   Sq Epi: x / Non Sq Epi: x / Bacteria: x        Culture - Urine (collected 22 Aug 2023 06:50)  Source: Clean Catch Clean Catch (Midstream)  Final Report (23 Aug 2023 06:56):    No growth    Culture - Blood (collected 22 Aug 2023 05:30)  Source: .Blood Blood-Peripheral  Gram Stain (22 Aug 2023 22:22):    Growth in aerobic bottle: Gram Negative Rods    Growth in anaerobic bottle: Gram Negative Rods  Preliminary Report (22 Aug 2023 22:22):    Growth in aerobic bottle: Gram Negative Rods    Growth in anaerobic bottle: Gram Negative Rods    Culture - Blood (collected 22 Aug 2023 05:30)  Source: .Blood Blood-Peripheral  Gram Stain (22 Aug 2023 22:43):    Growth in aerobic bottle: Gram Negative Rods    Growth in anaerobic bottle: Gram Negative Rods  Preliminary Report (22 Aug 2023 22:43):    Growth in aerobic bottle: Gram Negative Rods    Growth in anaerobic bottle: Gram Negative Rods    Direct identification is available within approximately 3-5    hours either by Blood Panel Multiplexed PCR or Direct    MALDI-TOF. Details: [email or web address]  Organism: Blood Culture PCR (22 Aug 2023 23:21)  Organism: Blood Culture PCR (22 Aug 2023 23:21)      -  Klebsiella oxytoca: Detec      -  Escherichia coli: Detec      Method Type: PCR          RADIOLOGY & ADDITIONAL TESTS:    < from: US Abdomen Upper Quadrant Right (08.23.23 @ 09:02) >    FINDINGS:  Liver: Within normal limits.  Bile ducts: Normal caliber. Common bile duct measures 3 mm.  Gallbladder: No cholelithiasis. Wall thickening (6-7 mm). No focal   tenderness.  Pancreas: Visualized portions are within normal limits.  Right kidney: 10.2 cm. No hydronephrosis. Septated cyst measuring 3 x 2.5   x 3.1 cm. Cyst measuring 2.7 x 2.5 x 2.8 cm.  Ascites: None.  IVC: Visualized portions are within normal limits.  Other: Trace right pleural effusion.    IMPRESSION:  No cholelithiasis or biliary ductal dilatation.  Nonspecific gallbladder wall thickening.        --- End of Report ---    < end of copied text >    Care Discussed with Consultants/Other Providers: yes, surgery

## 2023-08-23 NOTE — H&P ADULT - NSHPPHYSICALEXAM_GEN_ALL_CORE
Vital Signs (24 Hrs):  T(C): 36.7 (08-22-23 @ 23:20), Max: 38.3 (08-22-23 @ 05:44)  HR: 67 (08-22-23 @ 23:20) (67 - 124)  BP: 101/65 (08-22-23 @ 23:20) (89/52 - 131/74)  RR: 19 (08-22-23 @ 23:20) (17 - 30)  SpO2: 97% (08-22-23 @ 23:20) (89% - 100%)  Daily Height in cm: 160.02 (22 Aug 2023 23:20)

## 2023-08-23 NOTE — CONSULT NOTE ADULT - TIME BILLING
Discussion with patient and family regarding all options and risks; all lab values and imaging studies reviewed; discussed with Medicine

## 2023-08-23 NOTE — H&P ADULT - TIME BILLING
Management of acute medical problem, thorough review of labs and imaging. Management of acute medical problem, thorough review of labs and imaging, d/w consultants.

## 2023-08-23 NOTE — H&P ADULT - ASSESSMENT
77-year-old male with past medical history hypertension hyperlipidemia presents  to the ED with complaints of tactile fever chills since yesterday morning,  Pt was given a dose of Ceftriaxone 1gm IV, labs, urinalysis, CT Chest were unremarkable, pt was feeling better so pt  was d/jessica home. Pt was recalled this evening to go back to ED blocd cultures resulted positive 4/4 for gram neg rods. Patient states he was fine until yesterday, when he started having fever, chilld and myalgias.  He denies chest pain, cough, dyspnea, abd pain, diarrhea, nausea, vomiting, dysuria,  urgency or frequency nor back or flank pain, Denies recent travel nor sick contacts.   Pt noted to be hypotensive in ED 85/50, received total of 3 L IVF, with good response.  77-year-old male with past medical history hypertension hyperlipidemia presents  to the ED with complaints of tactile fever chills since yesterday morning,  Pt was given a dose of Ceftriaxone 1gm IV, labs, urinalysis, CT Chest were unremarkable, pt was feeling better so pt  was d/jessica home. Pt was recalled this evening to go back to ED blocd cultures resulted positive 4/4 for gram neg rods. Patient states he was fine until yesterday, when he started having fever, chilld and myalgias.  He denies chest pain, cough, dyspnea, abd pain, diarrhea, nausea, vomiting, dysuria,  urgency or frequency nor back or flank pain, Denies recent travel nor sick contacts.   Pt noted to be hypotensive in ED 85/50, received total of 3 L IVF, with good response.   CT Abd/pelvis prelim reports nonspecific periportal edema, perihepatic/pericholecystic fluid and b/l perinephric stranding.     Gram negative Sepsis/bacteremia, likely  vs GI/biliary source  UTI vs cholecystitis (pt, however w/ no abd/nor flank pain)  Mild transaminitis, thrombocytopenia from sepsis  Hypokalemia, hypomagnesemia  Pt w/hx of HTN (was on thiazide diuretic), HLD    Admit  Empiric IV Ceftriaxone 2gm IV daily, will give a dose of Gentamicin 300 mg IVx1, pending final culture results  IV hydration, replete K, replete Mg  Will hold ARB/Thiazide - pt is hypotensive  Cont Statin  Will get RUQ sono - assess GB  ID Consult-Dr Henning  Surgery consult- Dr Browne  FFup labs  GI/DVT prophylaxis  Prognosis guarded

## 2023-08-23 NOTE — H&P ADULT - HISTORY OF PRESENT ILLNESS
77-year-old male with past medical history hypertension hyperlipidemia presenting to the ED with complaints of tactile fever chills since yesterday morning, patient denies any reports of dysuria urgency or frequency denies any flank pain or back pain, denies any hematuria no reported chest pain shortness of breath or abdominal pain.,  Patient was recently seen in the ED and had elevated lactate negative RVP lactate was elevated with repeat normalized blood cultures positive for gram-negative rods called back to the ED.  77-year-old male with past medical history hypertension hyperlipidemia presents  to the ED with complaints of tactile fever chills since yesterday morning, Pt was recalled to go back to ED because of positive 4/4 blood cultures for gram neg rods. Patient states he was fine until yesterday, when he started having fever, chilld and myalgias.  He denies chest pain, cough, dyspnea, abd pain, diarrhea, nausea, vomiting, dysuria,  urgency or frequency nor back or flank pain, Denies recent travel nor sick contacts.   77-year-old male with past medical history hypertension hyperlipidemia presents  to the ED with complaints of tactile fever chills since yesterday morning,  Pt was given a dose of Ceftriaxone 1gm IV and was d/jessica home. Pt was recalled to go back to ED because of positive 4/4 blood cultures for gram neg rods. Patient states he was fine until yesterday, when he started having fever, chilld and myalgias.  He denies chest pain, cough, dyspnea, abd pain, diarrhea, nausea, vomiting, dysuria,  urgency or frequency nor back or flank pain, Denies recent travel nor sick contacts.   77-year-old male with past medical history hypertension hyperlipidemia presents  to the ED with complaints of tactile fever chills since yesterday morning,  Pt was given a dose of Ceftriaxone 1gm IV, labs, urinalysis, CT Chest were unremarkable, pt was feeling better so pt  was d/jessica home. Pt was recalled this evening to go back to ED blocd cultures resulted positive 4/4 for gram neg rods. Patient states he was fine until yesterday, when he started having fever, chilld and myalgias.  He denies chest pain, cough, dyspnea, abd pain, diarrhea, nausea, vomiting, dysuria,  urgency or frequency nor back or flank pain, Denies recent travel nor sick contacts.   Pt noted to be hypotensive in ED 85/50, received total of 3 L IVF, with good response.   77-year-old male with past medical history hypertension hyperlipidemia presents  to the ED with complaints of tactile fever chills since yesterday morning,  Pt was given a dose of Ceftriaxone 1gm IV, labs, urinalysis, CT Chest were unremarkable, pt was feeling better so pt  was d/jessica home. Pt was recalled this evening to go back to ED blocd cultures resulted positive 4/4 for gram neg rods. Patient states he was fine until yesterday, when he started having fever, chilld and myalgias.  He denies chest pain, cough, dyspnea, abd pain, diarrhea, nausea, vomiting, dysuria,  urgency or frequency nor back or flank pain, Denies recent travel nor sick contacts.   Pt noted to be hypotensive in ED 85/50, received total of 3 L IVF, with good response.   CT Abd/pelvis prelim reports 77-year-old male with past medical history hypertension hyperlipidemia presents  to the ED with complaints of tactile fever chills since yesterday morning,  Pt was given a dose of Ceftriaxone 1gm IV, labs, urinalysis, CT Chest were unremarkable, pt was feeling better so pt  was d/jessica home. Pt was recalled this evening to go back to ED blocd cultures resulted positive 4/4 for gram neg rods. Patient states he was fine until yesterday, when he started having fever, chilld and myalgias.  He denies chest pain, cough, dyspnea, abd pain, diarrhea, nausea, vomiting, dysuria,  urgency or frequency nor back or flank pain, Denies recent travel nor sick contacts.   Pt noted to be hypotensive in ED 85/50, received total of 3 L IVF, with good response.   CT Abd/pelvis prelim reports nonspecific periportal edema, perihepatic/pericholecystic fluid and b/l perinephric stranding.

## 2023-08-23 NOTE — ED ADULT NURSE REASSESSMENT NOTE - NS ED NURSE REASSESS COMMENT FT1
pt family requesting test result of MRI performed this morning,  NP janeen made aware and states she will come down when shes available. pt family made aware. pt givne dinner tray at this time.

## 2023-08-24 DIAGNOSIS — R78.81 BACTEREMIA: ICD-10-CM

## 2023-08-24 LAB
-  AMIKACIN: SIGNIFICANT CHANGE UP
-  AMIKACIN: SIGNIFICANT CHANGE UP
-  AMPICILLIN/SULBACTAM: SIGNIFICANT CHANGE UP
-  AMPICILLIN/SULBACTAM: SIGNIFICANT CHANGE UP
-  AMPICILLIN: SIGNIFICANT CHANGE UP
-  AMPICILLIN: SIGNIFICANT CHANGE UP
-  AZTREONAM: SIGNIFICANT CHANGE UP
-  AZTREONAM: SIGNIFICANT CHANGE UP
-  CEFAZOLIN: SIGNIFICANT CHANGE UP
-  CEFAZOLIN: SIGNIFICANT CHANGE UP
-  CEFEPIME: SIGNIFICANT CHANGE UP
-  CEFEPIME: SIGNIFICANT CHANGE UP
-  CEFOXITIN: SIGNIFICANT CHANGE UP
-  CEFOXITIN: SIGNIFICANT CHANGE UP
-  CEFTRIAXONE: SIGNIFICANT CHANGE UP
-  CEFTRIAXONE: SIGNIFICANT CHANGE UP
-  CIPROFLOXACIN: SIGNIFICANT CHANGE UP
-  CIPROFLOXACIN: SIGNIFICANT CHANGE UP
-  ERTAPENEM: SIGNIFICANT CHANGE UP
-  ERTAPENEM: SIGNIFICANT CHANGE UP
-  GENTAMICIN: SIGNIFICANT CHANGE UP
-  GENTAMICIN: SIGNIFICANT CHANGE UP
-  IMIPENEM: SIGNIFICANT CHANGE UP
-  IMIPENEM: SIGNIFICANT CHANGE UP
-  LEVOFLOXACIN: SIGNIFICANT CHANGE UP
-  LEVOFLOXACIN: SIGNIFICANT CHANGE UP
-  MEROPENEM: SIGNIFICANT CHANGE UP
-  MEROPENEM: SIGNIFICANT CHANGE UP
-  PIPERACILLIN/TAZOBACTAM: SIGNIFICANT CHANGE UP
-  PIPERACILLIN/TAZOBACTAM: SIGNIFICANT CHANGE UP
-  TOBRAMYCIN: SIGNIFICANT CHANGE UP
-  TOBRAMYCIN: SIGNIFICANT CHANGE UP
-  TRIMETHOPRIM/SULFAMETHOXAZOLE: SIGNIFICANT CHANGE UP
-  TRIMETHOPRIM/SULFAMETHOXAZOLE: SIGNIFICANT CHANGE UP
ANION GAP SERPL CALC-SCNC: 6 MMOL/L — SIGNIFICANT CHANGE UP (ref 5–17)
APPEARANCE UR: CLEAR — SIGNIFICANT CHANGE UP
BACTERIA # UR AUTO: NEGATIVE /HPF — SIGNIFICANT CHANGE UP
BILIRUB UR-MCNC: NEGATIVE — SIGNIFICANT CHANGE UP
BUN SERPL-MCNC: 11 MG/DL — SIGNIFICANT CHANGE UP (ref 7–23)
CALCIUM SERPL-MCNC: 8.5 MG/DL — SIGNIFICANT CHANGE UP (ref 8.4–10.5)
CHLORIDE SERPL-SCNC: 109 MMOL/L — HIGH (ref 96–108)
CO2 SERPL-SCNC: 29 MMOL/L — SIGNIFICANT CHANGE UP (ref 22–31)
COLOR SPEC: YELLOW — SIGNIFICANT CHANGE UP
COMMENT - URINE: SIGNIFICANT CHANGE UP
CREAT SERPL-MCNC: 0.89 MG/DL — SIGNIFICANT CHANGE UP (ref 0.5–1.3)
DIFF PNL FLD: ABNORMAL
EGFR: 88 ML/MIN/1.73M2 — SIGNIFICANT CHANGE UP
EPI CELLS # UR: 0 — SIGNIFICANT CHANGE UP
GLUCOSE SERPL-MCNC: 106 MG/DL — HIGH (ref 70–99)
GLUCOSE UR QL: NEGATIVE MG/DL — SIGNIFICANT CHANGE UP
HCT VFR BLD CALC: 31.4 % — LOW (ref 39–50)
HGB BLD-MCNC: 10.8 G/DL — LOW (ref 13–17)
HYALINE CASTS # UR AUTO: 0 — SIGNIFICANT CHANGE UP
KETONES UR-MCNC: NEGATIVE MG/DL — SIGNIFICANT CHANGE UP
LEUKOCYTE ESTERASE UR-ACNC: NEGATIVE — SIGNIFICANT CHANGE UP
MCHC RBC-ENTMCNC: 31 PG — SIGNIFICANT CHANGE UP (ref 27–34)
MCHC RBC-ENTMCNC: 34.4 GM/DL — SIGNIFICANT CHANGE UP (ref 32–36)
MCV RBC AUTO: 90.2 FL — SIGNIFICANT CHANGE UP (ref 80–100)
METHOD TYPE: SIGNIFICANT CHANGE UP
METHOD TYPE: SIGNIFICANT CHANGE UP
NITRITE UR-MCNC: NEGATIVE — SIGNIFICANT CHANGE UP
NRBC # BLD: 0 /100 WBCS — SIGNIFICANT CHANGE UP (ref 0–0)
PH UR: 5.5 — SIGNIFICANT CHANGE UP (ref 5–8)
PLATELET # BLD AUTO: 94 K/UL — LOW (ref 150–400)
POTASSIUM SERPL-MCNC: 3.7 MMOL/L — SIGNIFICANT CHANGE UP (ref 3.5–5.3)
POTASSIUM SERPL-SCNC: 3.7 MMOL/L — SIGNIFICANT CHANGE UP (ref 3.5–5.3)
PROT UR-MCNC: NEGATIVE MG/DL — SIGNIFICANT CHANGE UP
RBC # BLD: 3.48 M/UL — LOW (ref 4.2–5.8)
RBC # FLD: 13.9 % — SIGNIFICANT CHANGE UP (ref 10.3–14.5)
RBC CASTS # UR COMP ASSIST: 2 /HPF — SIGNIFICANT CHANGE UP (ref 0–4)
SODIUM SERPL-SCNC: 144 MMOL/L — SIGNIFICANT CHANGE UP (ref 135–145)
SP GR SPEC: 1.01 — SIGNIFICANT CHANGE UP (ref 1–1.03)
UROBILINOGEN FLD QL: 0.2 MG/DL — SIGNIFICANT CHANGE UP (ref 0.2–1)
WBC # BLD: 7.41 K/UL — SIGNIFICANT CHANGE UP (ref 3.8–10.5)
WBC # FLD AUTO: 7.41 K/UL — SIGNIFICANT CHANGE UP (ref 3.8–10.5)
WBC UR QL: 1 /HPF — SIGNIFICANT CHANGE UP (ref 0–5)

## 2023-08-24 PROCEDURE — 99232 SBSQ HOSP IP/OBS MODERATE 35: CPT

## 2023-08-24 PROCEDURE — 99233 SBSQ HOSP IP/OBS HIGH 50: CPT

## 2023-08-24 PROCEDURE — 99223 1ST HOSP IP/OBS HIGH 75: CPT

## 2023-08-24 RX ORDER — SOD SULF/SODIUM/NAHCO3/KCL/PEG
1000 SOLUTION, RECONSTITUTED, ORAL ORAL ONCE
Refills: 0 | Status: COMPLETED | OUTPATIENT
Start: 2023-08-24 | End: 2023-08-24

## 2023-08-24 RX ORDER — SOD SULF/SODIUM/NAHCO3/KCL/PEG
1000 SOLUTION, RECONSTITUTED, ORAL ORAL ONCE
Refills: 0 | Status: COMPLETED | OUTPATIENT
Start: 2023-08-25 | End: 2023-08-25

## 2023-08-24 RX ADMIN — CEFTRIAXONE 100 MILLIGRAM(S): 500 INJECTION, POWDER, FOR SOLUTION INTRAMUSCULAR; INTRAVENOUS at 01:10

## 2023-08-24 RX ADMIN — ATORVASTATIN CALCIUM 40 MILLIGRAM(S): 80 TABLET, FILM COATED ORAL at 22:07

## 2023-08-24 RX ADMIN — ATORVASTATIN CALCIUM 40 MILLIGRAM(S): 80 TABLET, FILM COATED ORAL at 01:35

## 2023-08-24 RX ADMIN — Medication 81 MILLIGRAM(S): at 13:58

## 2023-08-24 RX ADMIN — Medication 200 MILLIGRAM(S): at 22:07

## 2023-08-24 RX ADMIN — SODIUM CHLORIDE 125 MILLILITER(S): 9 INJECTION, SOLUTION INTRAVENOUS at 02:11

## 2023-08-24 RX ADMIN — ENOXAPARIN SODIUM 40 MILLIGRAM(S): 100 INJECTION SUBCUTANEOUS at 13:58

## 2023-08-24 RX ADMIN — ONDANSETRON 4 MILLIGRAM(S): 8 TABLET, FILM COATED ORAL at 23:16

## 2023-08-24 NOTE — PROGRESS NOTE ADULT - SUBJECTIVE AND OBJECTIVE BOX
CC: f/u for polymicrobic bacteremia    Patient reports he feels fine, wants to go home    REVIEW OF SYSTEMS:  All other review of systems negative (Comprehensive ROS)    Antimicrobials Day #  :  [id number]   IVPB 2000 milliGRAM(s) IV Intermittent every 24 hours    Other Medications Reviewed    T(F): 98.1 (08-24-23 @ 11:00), Max: 98.1 (08-24-23 @ 11:00)  HR: 79 (08-24-23 @ 11:00)  BP: 126/65 (08-24-23 @ 11:00)  RR: 20 (08-24-23 @ 11:00)  SpO2: 99% (08-24-23 @ 11:00)  Wt(kg): --    PHYSICAL EXAM:  General: alert, no acute distress  Eyes:  anicteric, no conjunctival injection, no discharge  Oropharynx: no lesions or injection 	  Neck: supple, without adenopathy  Lungs: clear to auscultation  Heart: regular rate and rhythm; no murmur, rubs or gallops  Abdomen: soft, nondistended, nontender, without mass or organomegaly  Skin: no lesions  Extremities: no clubbing, cyanosis, or edema  Neurologic: alert, oriented, moves all extremities    LAB RESULTS:                        10.8   7.41  )-----------( 94       ( 24 Aug 2023 08:20 )             31.4     08-24    144  |  109<H>  |  11  ----------------------------<  106<H>  3.7   |  29  |  0.89    Ca    8.5      24 Aug 2023 08:20  Mg     1.5     08-23    TPro  5.0<L>  /  Alb  2.3<L>  /  TBili  0.5  /  DBili  0.2  /  AST  34  /  ALT  37  /  AlkPhos  49  08-23    LIVER FUNCTIONS - ( 23 Aug 2023 05:50 )  Alb: 2.3 g/dL / Pro: 5.0 g/dL / ALK PHOS: 49 U/L / ALT: 37 U/L / AST: 34 U/L / GGT: x           Urinalysis Basic - ( 24 Aug 2023 08:20 )    Color: x / Appearance: x / SG: x / pH: x  Gluc: 106 mg/dL / Ketone: x  / Bili: x / Urobili: x   Blood: x / Protein: x / Nitrite: x   Leuk Esterase: x / RBC: x / WBC x   Sq Epi: x / Non Sq Epi: x / Bacteria: x      MICROBIOLOGY:  RECENT CULTURES:  08-22 @ 23:23 .Blood Blood-Peripheral     No growth at 24 hours      08-22 @ 06:50 Clean Catch Clean Catch (Midstream)     No growth      08-22 @ 05:30 .Blood Blood-Peripheral Blood Culture PCR  Escherichia coli  Escherichia coli    Growth in aerobic bottle: Escherichia coli Multiple Morphological Strains  Growth in aerobic and anaerobic bottles: Klebsiella oxytoca/Raoultella  ornithinolytica Susceptibility to follow.  Direct identification is available within approximately 3-5  hours either by Blood Panel Multiplexed PCR or Direct  MALDI-TOF. Details: [email or web address]    Growth in aerobic bottle: Gram Negative Rods  Growth in anaerobic bottle: Gram Negative Rods        RADIOLOGY REVIEWED:    < from: NM Hepatobiliary Imaging (08.23.23 @ 11:19) >    ACC: [id number] EXAM:  NM HEPATOBILIARY IMG   ORDERED BY: CECE VOSS     PROCEDURE DATE:  08/23/2023          INTERPRETATION:  CLINICAL INFORMATION: 77-year-old man with RIGHT UPPER   quadrant pain referred for evaluation of acute cholecystitis.    DURATION of DYNAMIC SERIES: 60 minutes  RADIOPHARMACEUTICAL: 3.2 mCi Tc-99m-Mebrofenin, I.V.    TECHNIQUE: Dynamic imaging of the anterior abdomen was performed   following radiopharmaceutical injection. Static images of the abdomen in   the anterior,right anterior oblique, and right lateral views were   obtained immediately thereafter.    COMPARISON: None    OTHER STUDIES USED FOR CORRELATION: CT abdomen/pelvis with IV contrast   8/23/2023    FINDINGS: There is prompt, homogeneous uptake of radiopharmaceutical by   the hepatocytes. Activity is seen in the gallbladder at approximately 10   minutes and in the bowel at approximately 20 minutes. There is good   clearance of activity from the liver by the end of the study.    IMPRESSION: Normal hepatobiliary scan.    No evidence of acute cholecystitis or biliary obstruction.        --- End of Report ---        < end of copied text >    < from: CT Abdomen and Pelvis w/ IV Cont (08.23.23 @ 01:57) >    ACC: [id number] EXAM:  CT ABDOMEN AND PELVIS IC   ORDERED BY: DENNISE FINNEY     PROCEDURE DATE:  08/23/2023          INTERPRETATION:  CLINICAL INFORMATION: Fever.  Chills.  Sepsis.    Bacteremia.    COMPARISON: 10/16/2011    CONTRAST/COMPLICATIONS:  IV Contrast: Omnipaque 350  90 cc administered   10 cc discarded  Oral Contrast: NONE  Complications: None reported at time of study completion    PROCEDURE:  CT of the Abdomen and Pelvis was performed.  Sagittal and coronal reformats were performed.    FINDINGS:  LOWER CHEST: Trace pleural effusions bilaterally.  The heart appears   mildly enlarged.  Mild atherosclerotic calcification the coronary   arteries.    LIVER: Patchy hypoattenuation compatible with hepatic steatosis.    Nonspecific mild periportal edema.  BILE DUCTS: Normal caliber.  GALLBLADDER: Incompletely distended.  No evidence of gallstones.  Trace   pericholecystic fluid.  SPLEEN: Within normal limits.  PANCREAS: Within normal limits.  ADRENALS: Within normal limits.  KIDNEYS/URETERS: Nonspecific perinephric fat stranding/free fluid   bilaterally.    The kidneys enhance symmetrically without hydronephrosis   or renal stones.  There are a few renal cysts bilaterally, measuring up   to 5 cm and the left kidney (2:55) and 3.3 cm in the right kidney (2:45).    Subcentimeter hypoattenuating foci in both kidneys are too small to   accurately characterize by CT scan.    BLADDER: Within normal limits.  REPRODUCTIVE ORGANS: A mildly enlarged prostate measures approximately   3.6 x 4.7 x 4.2 cm (AP x LR x CC), with an ellipsoid volume of   approximately 37 mL (2:114 and 602:54.    BOWEL: Nonspecific edema around the proximal duodenum.  No bowel   obstruction.  The appendix measures up to 6 mm in caliber, upper limits   ofnormal in size; no secondary signs of appendicitis.  Mild   diverticulosis of the cecum, ascending colon, transverse colon and   proximal descending colon; severe diverticulosis of the distal descending   colon and sigmoid colon.  PERITONEUM: Trace trace perihepatic and perisplenic ascites.  Fluid/edema   around the proximal duodenum..  VESSELS: Mild atherosclerotic calcification of the aortoiliac tree.    Incidentally noted is a circumaortic left renal vein, normal variant.  RETROPERITONEUM/LYMPH NODES: No lymphadenopathy.  ABDOMINAL WALL: Small fat-containing hernias bilaterally.  BONES: Mild lumbar spine degenerative changes.  Mild spinal canal   stenosis at L3-L4 and L4-L5.    IMPRESSION:  Mild periportal edema and pericholecystic fluid are nonspecific.  Trace   fluid/edema around the proximal duodenum is nonspecific.    No CT   evidence of gallstones or dilated bile ducts.  Follow-up right upper   quadrant ultrasound may be obtained for further evaluation.  Duodenitis   or peptic ulcer disease should be excluded clinically.    No evidence of bowel obstruction, gastrointestinal perforation or   abscess/drainable fluid collection.    Nonspecific perinephric fat stranding/free fluid bilaterally.  No   hydronephrosis or renal stones.    The appendix is upper limits of normal in size; no secondary signs of   appendicitis.    Colonic diverticulosis without evidence for acute diverticulitis.    Trace pleural effusions bilaterally.    Trace perihepatic and perisplenic ascites.    < end of copied text >          < from: US Abdomen Upper Quadrant Right (08.23.23 @ 09:02) >    ACC: [id number] EXAM:  US ABDOMEN RT UPR QUADRANT   ORDERED BY: DENNISE FINNEY     PROCEDURE DATE:  08/23/2023          INTERPRETATION:  CLINICAL INFORMATION: Sepsis    COMPARISON: CT dated 8/23/2023    TECHNIQUE: Sonography of the right upper quadrant.    FINDINGS:  Liver: Within normal limits.  Bile ducts: Normal caliber. Common bile duct measures 3 mm.  Gallbladder: No cholelithiasis. Wall thickening (6-7 mm). No focal   tenderness.  Pancreas: Visualized portions are within normal limits.  Right kidney: 10.2 cm. No hydronephrosis. Septated cyst measuring 3 x 2.5   x 3.1 cm. Cyst measuring 2.7 x 2.5 x 2.8 cm.  Ascites: None.  IVC: Visualized portions are within normal limits.  Other: Trace right pleural effusion.    IMPRESSION:  No cholelithiasis or biliary ductal dilatation.  Nonspecific gallbladder wall thickening.    < from: CT Chest No Cont (08.22.23 @ 08:16) >    ACC: [id number] EXAM:  CT CHEST   ORDERED BY: WENDY STEWART     PROCEDURE DATE:  08/22/2023          INTERPRETATION:  CLINICAL INFORMATION: Sepsis.    COMPARISON: None.    CONTRAST/COMPLICATIONS:  IV Contrast: NONE  Oral Contrast: NONE  Complications: None reported at time of study completion    PROCEDURE:  CT of the Chest was performed.  Sagittal and coronal reformats were performed.    FINDINGS:    LUNGS AND AIRWAYS: Patent central airways.  Lungs are clear.  PLEURA: No pleural effusion. No pneumothorax.  MEDIASTINUM AND NIEVES: No lymphadenopathy.  VESSELS: Coronary arterial calcifications.  HEART: Heart size is normal. No pericardial effusion.  CHEST WALL AND LOWER NECK: Within normal limits.  VISUALIZED UPPER ABDOMEN: Punctate calcification upper pole left kidney,   most consistent with a renal calculus. Visualized portions bilateral   adrenal glands. Unremarkable. 3.1 cm cyst upper pole right kidney.  BONES: No acute osseous fractures.    IMPRESSION: No focal infiltrate or lobar consolidation. No pleural   effusion.          --- End of Report ---            JANEEN MENSAH MD; Attending Radiologist  This document has been electronically signed. Aug 22 2023  9:09AM    < end of copied text >      --- End of Report ---      < end of copied text >  Assessment:  Elderly man seen in ED for rigors, went home, called back for positive blood cx with ecoli and kleb oxytoca. Suspect occult gi source. Imaging with us, ct a/p , chest and hida nothing definitive. He needs further w/u with mrcp and colonoscopy and gi evaluation. His ecoli sensi is back but the kleb is not. He may not stay for further w/u and to await final culture data, I advised him that he needs to stay to get final micro data and finish gi w/u.  If he insists on leaving can do cipro 500mg po bid and flagyl 500mg po bid for 10 days and then do further f/u as outpt but would make it ama d/c. We dont have   Plan:  continue ctx for now  await further micro data  gi eval, mrcp  r/u final cultures and sensi  gi consult  if insists on leaving do cipro 500mg po bid and flagyl 500mg po bid and have pt see gi and f/u with his pcp and against medical advise discharge

## 2023-08-24 NOTE — PHYSICAL THERAPY INITIAL EVALUATION ADULT - MANUAL MUSCLE TESTING RESULTS, REHAB EVAL
Spoke to pt and informed Dr. Garland sent rx for test strips on 3/7/18 to [facility]   no strength deficits were identified

## 2023-08-24 NOTE — PATIENT PROFILE ADULT - FALL HARM RISK - HARM RISK INTERVENTIONS

## 2023-08-24 NOTE — CONSULT NOTE ADULT - ASSESSMENT
IMPRESSION: Possible acute acalculous cholecystitis - doubt, as there is no abdominal pain or tenderness    PLAN: NPO, ALEXANDRA           HIDA scan to R/O cholecystitis
Patient is a 77y male witha past history of HTN and HLD who was evaluated in the ER yesterday with fever to 102,rigors and a leukocytosis, sent home, and than called back when his blood cultures were growing GNR in both sets.  He had a similar event in March of 2023, had negative blood and urine cultures and was not hospitalized.  He denies any  symptoms  and he also denies any a He has received CTX and gent earlier today.  Polymicrobic bacteremia in setting or rigors and no localizing complaints.  He had a similar event in march although bacteremia was not documented.  He was feeling better prior to antibiotics.Cryptogenic GI focus for bacteremia.His LFT's are normal, they were minimally elevated yesterday. His UA is benign.CT scan, RUQ sono, and HIDA have not identified the sotce of bacteremia  Suggest:  1 Daily CTX  2  Favor GI evaluation, ? role for colonoscopy  3  With mild periportal edema and unexplained GNR bacteremia I will send a strongyloides serology.If positive will check stool for Ova and treat.  4 . Observe for changes in exam.
77-year-old male with past medical history hypertension hyperlipidemia, he was in ER for fever chills 2 days before discharged after Antibiotics.    Pt was recalled to go back to ED blood  cultures resulted positive 4/4 for gram neg rods.  G I Consult for Bacteremia , suspect GI source.

## 2023-08-24 NOTE — CONSULT NOTE ADULT - REASON FOR ADMISSION
gram neg bacteremia - sepsis

## 2023-08-24 NOTE — PATIENT PROFILE ADULT - DEAF OR HARD OF HEARING?
Problem: Altered Mood, Depressive Behavior  Goal: STG-Absence of Self Harm  Outcome: Ongoing  Patient is apprehensive and anxious. She is tearful at times,  She is receptive to one on one time with writer. She will also seek staff out for interaction and reassurance. She is focused on not being home with her children. She denies any suicidal thoughts. She denies any thoughts to harm others . She denies paranoia and suspicions. She attends all groups. no

## 2023-08-24 NOTE — PROGRESS NOTE ADULT - ASSESSMENT
78 y/o M with PMH HTN, HLD c/o fever, chills x 2 days presented to ER, received cftx x1 with improvement, discharged home and referred back for evaluation due to blood cultures positive 4/4 for gram neg rods; admitted for bacteremia, unknown source.     #Gram negative Sepsis/bacteremia, likely  vs GI/biliary source  - Blood cultures from 8/22 reviewed - E Coli pansensitive  - HIDA scan negative   - UA is benign.CT scan, RUQ sono, and HIDA have not identified the sotce of bacteremia  - Continue ceftriaxone IV for now  -       #Mild transaminitis, thrombocytopenia from sepsis      Hypokalemia, hypomagnesemia  Pt w/hx of HTN (was on thiazide diuretic), HLD  -Continue tele monitoring  -Continue empiric abx - cftx and gentamycin   -ID consulted      #HTN  - ARB/Thiazide held on admission for hypotension      #HLD  - Cont Statin    #DVT ppx  - Lovenox    Wife, son, and daughter updated at bedside  Primary contact daughter Fay  78 y/o M with PMH HTN, HLD c/o fever, chills x 2 days presented to ER, received cftx x1 with improvement, discharged home and referred back for evaluation due to blood cultures positive 4/4 for gram neg rods; admitted for bacteremia, unknown source.     #Gram negative Sepsis/bacteremia, likely  vs GI/biliary source  - Blood cultures from 8/22 reviewed - E Coli, klebsiella. f/u sensitivities   - f/u repeat cultures   - HIDA scan negative   - UA is benign. CT scan, RUQ sono, and HIDA have not identified the source of bacteremia  - Continue ceftriaxone IV for now  - ID consulted, appreciate recs   - GI consult for possible ?MRCP    #Mild transaminitis, thrombocytopenia from sepsis  - Monitor     #Hypokalemia, hypomagnesemia  - Resolved     #HTN  - ARB/Thiazide held on admission for hypotension, resume as appropriate     #HLD  - Cont Statin    #DVT ppx  - Lovenox    8/24: Updated wife at bedside, updated daughter Fay via telephone   Primary contact tj Aponte [phone]

## 2023-08-24 NOTE — PHYSICAL THERAPY INITIAL EVALUATION ADULT - PERTINENT HX OF CURRENT PROBLEM, REHAB EVAL
Pt is 7y old  Male who presents with a chief complaint of gram neg bacteremia - sepsis. Referred to PT for functional evaluation.

## 2023-08-24 NOTE — CONSULT NOTE ADULT - PROBLEM SELECTOR RECOMMENDATION 9
Reviewed CT scan and HIDA scan  Normal LFTs, Bilirubin  MRCP to further checking the Biliary source  Will schedule for Colonoscopy tomorrow  NPO after midnight  Coloscopy perp

## 2023-08-24 NOTE — PATIENT PROFILE ADULT - FUNCTIONAL ASSESSMENT - DAILY ACTIVITY 2.
Head, normocephalic, atraumatic, Face, Face within normal limits, Ears, External ears within normal limits, Nose/Nasopharynx, External nose  normal appearance, nares patent, no nasal discharge, Mouth and Throat, Oral cavity appearance normal, Breath odor normal, Lips, Appearance normal , Head, normocephalic, atraumatic, Face, Face within normal limits, Ears, External ears within normal limits, Nose/Nasopharynx, External nose  normal appearance, nares patent, no nasal discharge, Mouth and Throat, Oral cavity appearance normal, Breath odor normal, Lips, Appearance normal
4 = No assist / stand by assistance

## 2023-08-24 NOTE — CONSULT NOTE ADULT - SUBJECTIVE AND OBJECTIVE BOX
Full note to follow    Needs HIDA scan
INTERVAL HPI/OVERNIGHT EVENTS:  HPI:  77-year-old male with past medical history hypertension hyperlipidemia presents  to the ED with complaints of tactile fever chills since yesterday morning,  Pt was given a dose of Ceftriaxone 1gm IV, labs, urinalysis, CT Chest were unremarkable, pt was feeling better so pt  was d/jessica home. Pt was recalled this evening to go back to ED blocd cultures resulted positive 4/4 for gram neg rods. Patient states he was fine until yesterday, when he started having fever, chilld and myalgias.  He denies chest pain, cough, dyspnea, abd pain, diarrhea, nausea, vomiting, dysuria,  urgency or frequency nor back or flank pain, Denies recent travel nor sick contacts.     G I Consult for Bacteremia , suspect GI source. Patient seen and examined at bed side. Family present. He denies abdominal pain, nausea, vomiting, diarrhea or constipation. He did not eat any thing out of ordinary , he was told he has diverticulosis in 2009 and since than he is very careful what he eat .   He had fever chills Tuesday came to ER after leaving ER he was feeling fine, no more fever or chills.   He had Colonoscopy 6 years before by Dr. Skinner was told all resulted normal.     Pt noted to be hypotensive in ED 85/50, received total of 3 L IVF, with good response.   CT Abd/pelvis prelim reports nonspecific periportal edema, perihepatic/pericholecystic fluid and b/l perinephric stranding.    (23 Aug 2023 00:44)    MEDICATIONS  (STANDING):  aspirin enteric coated 81 milliGRAM(s) Oral daily  atorvastatin 40 milliGRAM(s) Oral at bedtime  cefTRIAXone   IVPB 2000 milliGRAM(s) IV Intermittent every 24 hours  enoxaparin Injectable 40 milliGRAM(s) SubCutaneous every 24 hours    MEDICATIONS  (PRN):  acetaminophen     Tablet .. 650 milliGRAM(s) Oral every 6 hours PRN Temp greater or equal to 38C (100.4F), Mild Pain (1 - 3)  ondansetron Injectable 4 milliGRAM(s) IV Push every 6 hours PRN Nausea and/or Vomiting      Allergies    Dilaudid (Unknown)  morphine (Unknown)    Intolerances        PAST MEDICAL & SURGICAL HISTORY:  HTN (hypertension)      HLD (hyperlipidemia)      No significant past surgical history          REVIEW OF SYSTEMS      General:	  See HPI 	    PHYSICAL EXAM:   Vital Signs:  Vital Signs Last 24 Hrs  T(C): 36.7 (24 Aug 2023 11:00), Max: 36.7 (24 Aug 2023 11:00)  T(F): 98.1 (24 Aug 2023 11:00), Max: 98.1 (24 Aug 2023 11:00)  HR: 79 (24 Aug 2023 11:00) (79 - 98)  BP: 126/65 (24 Aug 2023 11:00) (105/54 - 126/65)  BP(mean): --  RR: 20 (24 Aug 2023 11:00) (18 - 20)  SpO2: 99% (24 Aug 2023 11:00) (98% - 100%)    Parameters below as of 24 Aug 2023 11:00  Patient On (Oxygen Delivery Method): room air      Daily     Daily I&O's Summary      GENERAL:  Appears stated age  HEENT:  NC/AT,  conjunctivae clear and pink,  CHEST:  Full & symmetric excursion  HEART:  Regular rhythm, S1, S2  ABDOMEN:  Soft, non-tender, non-distended, normoactive bowel sounds  EXTREMITIES  no cyanosis, clubbing or edema  SKIN:  No rash/warm/dry  NEURO:  Alert, oriented      LABS:                        10.8   7.41  )-----------( 94       ( 24 Aug 2023 08:20 )             31.4     08-24    144  |  109<H>  |  11  ----------------------------<  106<H>  3.7   |  29  |  0.89    Ca    8.5      24 Aug 2023 08:20  Mg     1.5     08-23    TPro  5.0<L>  /  Alb  2.3<L>  /  TBili  0.5  /  DBili  0.2  /  AST  34  /  ALT  37  /  AlkPhos  49  08-23    PT/INR - ( 22 Aug 2023 23:23 )   PT: 13.6 sec;   INR: 1.20 ratio         PTT - ( 22 Aug 2023 23:23 )  PTT:29.2 sec  Urinalysis Basic - ( 24 Aug 2023 08:20 )    Color: x / Appearance: x / SG: x / pH: x  Gluc: 106 mg/dL / Ketone: x  / Bili: x / Urobili: x   Blood: x / Protein: x / Nitrite: x   Leuk Esterase: x / RBC: x / WBC x   Sq Epi: x / Non Sq Epi: x / Bacteria: x      amylase   lipaseLipase: 43 U/L (08-22 @ 05:30)    RADIOLOGY & ADDITIONAL TESTS:  ACC: [id number] EXAM:  CT ABDOMEN AND PELVIS IC   ORDERED BY: DENNISE FINNEY     PROCEDURE DATE:  08/23/2023          INTERPRETATION:  CLINICAL INFORMATION: Fever.  Chills.  Sepsis.    Bacteremia.    COMPARISON: 10/16/2011    CONTRAST/COMPLICATIONS:  IV Contrast: Omnipaque 350  90 cc administered   10 cc discarded  Oral Contrast: NONE  Complications: None reported at time of study completion    PROCEDURE:  CT of the Abdomen and Pelvis was performed.  Sagittal and coronal reformats were performed.    FINDINGS:  LOWER CHEST: Trace pleural effusions bilaterally.  The heart appears   mildly enlarged.  Mild atherosclerotic calcification the coronary   arteries.    LIVER: Patchy hypoattenuation compatible with hepatic steatosis.    Nonspecific mild periportal edema.  BILE DUCTS: Normal caliber.  GALLBLADDER: Incompletely distended.  No evidence of gallstones.  Trace   pericholecystic fluid.  SPLEEN: Within normal limits.  PANCREAS: Within normal limits.  ADRENALS: Within normal limits.  KIDNEYS/URETERS: Nonspecific perinephric fat stranding/free fluid   bilaterally.    The kidneys enhance symmetrically without hydronephrosis   or renal stones.  There are a few renal cysts bilaterally, measuring up   to 5 cm and the left kidney (2:55) and 3.3 cm in the right kidney (2:45).    Subcentimeter hypoattenuating foci in both kidneys are too small to   accurately characterize by CT scan.    BLADDER: Within normal limits.  REPRODUCTIVE ORGANS: A mildly enlarged prostate measures approximately   3.6 x 4.7 x 4.2 cm (AP x LR x CC), with an ellipsoid volume of   approximately 37 mL (2:114 and 602:54.    BOWEL: Nonspecific edema around the proximal duodenum.  No bowel   obstruction.  The appendix measures up to 6 mm in caliber, upper limits   ofnormal in size; no secondary signs of appendicitis.  Mild   diverticulosis of the cecum, ascending colon, transverse colon and   proximal descending colon; severe diverticulosis of the distal descending   colon and sigmoid colon.  PERITONEUM: Trace trace perihepatic and perisplenic ascites.  Fluid/edema   around the proximal duodenum..  VESSELS: Mild atherosclerotic calcification of the aortoiliac tree.    Incidentally noted is a circumaortic left renal vein, normal variant.  RETROPERITONEUM/LYMPH NODES: No lymphadenopathy.  ABDOMINAL WALL: Small fat-containing hernias bilaterally.  BONES: Mild lumbar spine degenerative changes.  Mild spinal canal   stenosis at L3-L4 and L4-L5.    IMPRESSION:  Mild periportal edema and pericholecystic fluid are nonspecific.  Trace   fluid/edema around the proximal duodenum is nonspecific.    No CT   evidence of gallstones or dilated bile ducts.  Follow-up right upper   quadrant ultrasound may be obtained for further evaluation.  Duodenitis   or peptic ulcer disease should be excluded clinically.    No evidence of bowel obstruction, gastrointestinal perforation or   abscess/drainable fluid collection.    Nonspecific perinephric fat stranding/free fluid bilaterally.  No   hydronephrosis or renal stones.    The appendix is upper limits of normal in size; no secondary signs of   appendicitis.    Colonic diverticulosis without evidence for acute diverticulitis.    Trace pleural effusions bilaterally.    Trace perihepatic and perisplenic ascites.        --- End of Report ---            NELSON VALLES MD; Attending Radiologist  This document has been electronically signed. Aug 23 2023  6:04AM  ACC: [id number] EXAM:  NM HEPATOBILIARY IMG   ORDERED BY: CECE SANON DATE:  08/23/2023          INTERPRETATION:  CLINICAL INFORMATION: 77-year-old man with RIGHT UPPER   quadrant pain referred for evaluation of acute cholecystitis.    DURATION of DYNAMIC SERIES: 60 minutes  RADIOPHARMACEUTICAL: 3.2 mCi Tc-99m-Mebrofenin, I.V.    TECHNIQUE: Dynamic imaging of the anterior abdomen was performed   following radiopharmaceutical injection. Static images of the abdomen in   the anterior,right anterior oblique, and right lateral views were   obtained immediately thereafter.    COMPARISON: None    OTHER STUDIES USED FOR CORRELATION: CT abdomen/pelvis with IV contrast   8/23/2023    FINDINGS: There is prompt, homogeneous uptake of radiopharmaceutical by   the hepatocytes. Activity is seen in the gallbladder at approximately 10   minutes and in the bowel at approximately 20 minutes. There is good   clearance of activity from the liver by the end of the study.    IMPRESSION: Normal hepatobiliary scan.    No evidence of acute cholecystitis or biliary obstruction.        --- End of Report ---            LALA ZELAYA MD; Attending Radiologist  This document has been electronically signed. Aug 23 2023 11:29AM  
This 77 year old man developed fever, chills and generalized myalgias two days ago. He presented to the ED, received ALEXANDRA, and was discharged to home. The patient was called back to the ED last night due to BC demonstrating gram negative rods. A CT scan demonstrated fluid around the liver, the duodenum, right kidney and around a slightly thickened gallbladder without stones or sludge. The patient denied nausea, vomiting nor any abdominal or flank pain. He denied any previous similar symptoms. The WBC and LFT's are normal and remains afebrile since yesterday.        PAST MEDICAL & SURGICAL HISTORY:  No pertinent past medical history.    HTN  Hyperlipedemia      PFSH: All noncontributory    MEDICATIONS REVIEWED:acetaminophen     Tablet .. 650 milliGRAM(s) Oral every 6 hours PRN  aspirin enteric coated 81 milliGRAM(s) Oral daily  atorvastatin 40 milliGRAM(s) Oral at bedtime  enoxaparin Injectable 40 milliGRAM(s) SubCutaneous every 24 hours  famotidine  IVPB 20 milliGRAM(s) IV Intermittent daily  lactated ringers. 1000 milliLiter(s) IV Continuous <Continuous>  ondansetron Injectable 4 milliGRAM(s) IV Push every 6 hours PRN      ALLERGIES:Dilaudid (Unknown)  morphine (Unknown)      PHYSICAL EXAMINATION:  RESPIRATORY: Clear to auscultation bilaterally, respirations non-labored.  CARDIAC: RR, normal S1S2, no murmurs.  ABDOMEN: soft, BS present, no masses, no hernias, no peritoneal signs, no KLS, nontender.  VASCULAR: Equal and normal pulses.  MUSCULOSKELETAL: Full ROM, no deformities.      T(C): 37.1 (08-23-23 @ 08:47), Max: 37.1 (08-23-23 @ 08:47)  HR: 82 (08-23-23 @ 08:47) (66 - 92)  BP: 114/61 (08-23-23 @ 08:47) (84/67 - 114/61)  RR: 16 (08-23-23 @ 08:47) (14 - 20)  SpO2: 93% (08-23-23 @ 08:47) (93% - 99%)                          10.5   11.94 )-----------( 91       ( 23 Aug 2023 05:50 )             30.7       08-23    138  |  106  |  15  ----------------------------<  94  3.8   |  25  |  0.95    Ca    7.5<L>      23 Aug 2023 05:50  Mg     1.5     08-23    TPro  5.0<L>  /  Alb  2.3<L>  /  TBili  0.5  /  DBili  0.2  /  AST  34  /  ALT  37  /  AlkPhos  49  08-23      
HPI:   Patient is a 77y male witha past history of HTN and HLD who was evaluated in the ER yesterday with fever to 102,rigors and a leukocytosis, sent home, and than called back when his blood cultures were growing GNR in both sets.  He had a similar event in March of 2023, had negative blood and urine cultures and was not hospitalized.  He denies any  symptoms  and he also denies any a He has received CTX and gent earlier today.    REVIEW OF SYSTEMS:  All other review of systems negative (Comprehensive ROS)    PAST MEDICAL & SURGICAL HISTORY:  HTN (hypertension)      HLD (hyperlipidemia)      No significant past surgical history          Allergies    Dilaudid (Unknown)  morphine (Unknown)    Intolerances        Antimicrobials Day #  :day 1 CTX    Other Medications:  acetaminophen     Tablet .. 650 milliGRAM(s) Oral every 6 hours PRN  aspirin enteric coated 81 milliGRAM(s) Oral daily  atorvastatin 40 milliGRAM(s) Oral at bedtime  enoxaparin Injectable 40 milliGRAM(s) SubCutaneous every 24 hours  famotidine  IVPB 20 milliGRAM(s) IV Intermittent daily  lactated ringers. 1000 milliLiter(s) IV Continuous <Continuous>  ondansetron Injectable 4 milliGRAM(s) IV Push every 6 hours PRN      FAMILY HISTORY:  FH: hypertension        SOCIAL HISTORY:  Smoking:  ex   ETOH:ex     Drug Use: x       T(F): 98.7 (08-23-23 @ 08:47), Max: 98.7 (08-23-23 @ 08:47)  HR: 88 (08-23-23 @ 13:41)  BP: 114/60 (08-23-23 @ 13:41)  RR: 17 (08-23-23 @ 13:41)  SpO2: 94% (08-23-23 @ 13:41)  Wt(kg): --    PHYSICAL EXAM:  General: alert, no acute distress  Eyes:  anicteric, no conjunctival injection, no discharge  Oropharynx: no lesions or injection 	  Neck: supple, without adenopathy  Lungs: clear to auscultation  Heart: regular rate and rhythm; no murmur, rubs or gallops  Abdomen: soft, nondistended, nontender, without mass or organomegaly  Skin: no lesions  Extremities: no clubbing, cyanosis, or edema  Neurologic: alert, oriented, moves all extremities    LAB RESULTS:                        10.5   11.94 )-----------( 91       ( 23 Aug 2023 05:50 )             30.7     08-23    138  |  106  |  15  ----------------------------<  94  3.8   |  25  |  0.95    Ca    7.5<L>      23 Aug 2023 05:50  Mg     1.5     08-23    TPro  5.0<L>  /  Alb  2.3<L>  /  TBili  0.5  /  DBili  0.2  /  AST  34  /  ALT  37  /  AlkPhos  49  08-23    LIVER FUNCTIONS - ( 23 Aug 2023 05:50 )  Alb: 2.3 g/dL / Pro: 5.0 g/dL / ALK PHOS: 49 U/L / ALT: 37 U/L / AST: 34 U/L / GGT: x           Urinalysis Basic - ( 23 Aug 2023 05:50 )    Color: x / Appearance: x / SG: x / pH: x  Gluc: 94 mg/dL / Ketone: x  / Bili: x / Urobili: x   Blood: x / Protein: x / Nitrite: x   Leuk Esterase: x / RBC: x / WBC x   Sq Epi: x / Non Sq Epi: x / Bacteria: x        MICROBIOLOGY:  RECENT CULTURES:  08-22 @ 06:50 Clean Catch Clean Catch (Midstream)     No growth      08-22 @ 05:30 .Blood Blood-Peripheral Blood Culture PCR    Growth in aerobic bottle: Gram Negative Rods  Growth in anaerobic bottle: Gram Negative Rods  Direct identification is available within approximately 3-5  hours either by Blood Panel Multiplexed PCR or Direct  MALDI-TOF. Details: [email or web address]    Growth in aerobic bottle: Gram Negative Rods  Growth in anaerobic bottle: Gram Negative Rods          RADIOLOGY REVIEWED:  < from: NM Hepatobiliary Imaging (08.23.23 @ 11:19) >  IMPRESSION: Normal hepatobiliary scan.    No evidence of acute cholecystitis or biliary obstruction.    < end of copied text >  < from: US Abdomen Upper Quadrant Right (08.23.23 @ 09:02) >    IMPRESSION:  No cholelithiasis or biliary ductal dilatation.  Nonspecific gallbladder wall thickening.    < end of copied text >  < from: CT Abdomen and Pelvis w/ IV Cont (08.23.23 @ 01:57) >    IMPRESSION:  Mild periportal edema and pericholecystic fluid are nonspecific.  Trace   fluid/edema around the proximal duodenum is nonspecific.    No CT   evidence of gallstones or dilated bile ducts.  Follow-up right upper   quadrant ultrasound may be obtained for further evaluation.  Duodenitis   or peptic ulcer disease should be excluded clinically.    No evidence of bowel obstruction, gastrointestinal perforation or   abscess/drainable fluid collection.    Nonspecific perinephric fat stranding/free fluid bilaterally.  No   hydronephrosis or renal stones.    The appendix is upper limits of normal in size; no secondary signs of   appendicitis.    Colonic diverticulosis without evidence for acute diverticulitis.    Trace pleural effusions bilaterally.    Trace perihepatic and perisplenic ascites.    < end of copied text >

## 2023-08-24 NOTE — PROGRESS NOTE ADULT - SUBJECTIVE AND OBJECTIVE BOX
Patient is a 77y old  Male who presents with a chief complaint of gram neg bacteremia - sepsis (24 Aug 2023 13:17)    Patient seen and examined at bedside. No overnight events reported.     ALLERGIES:  Dilaudid (Unknown)  morphine (Unknown)    MEDICATIONS  (STANDING):  aspirin enteric coated 81 milliGRAM(s) Oral daily  atorvastatin 40 milliGRAM(s) Oral at bedtime  cefTRIAXone   IVPB 2000 milliGRAM(s) IV Intermittent every 24 hours  enoxaparin Injectable 40 milliGRAM(s) SubCutaneous every 24 hours    MEDICATIONS  (PRN):  acetaminophen     Tablet .. 650 milliGRAM(s) Oral every 6 hours PRN Temp greater or equal to 38C (100.4F), Mild Pain (1 - 3)  ondansetron Injectable 4 milliGRAM(s) IV Push every 6 hours PRN Nausea and/or Vomiting    Vital Signs Last 24 Hrs  T(F): 98.1 (24 Aug 2023 11:00), Max: 98.1 (24 Aug 2023 11:00)  HR: 79 (24 Aug 2023 11:00) (79 - 98)  BP: 126/65 (24 Aug 2023 11:00) (105/54 - 126/65)  RR: 20 (24 Aug 2023 11:00) (18 - 20)  SpO2: 99% (24 Aug 2023 11:00) (98% - 100%)    PHYSICAL EXAM:  General: NAD, A/O x 3  ENT: No gross hearing impairment, Moist mucous membranes, no thrush  Neck: Supple, No JVD  Lungs: Clear to auscultation bilaterally, good air entry, non-labored breathing  Cardio: RRR, S1/S2, No murmur  Abdomen: Soft, Nontender, Nondistended; Bowel sounds present  Extremities: No calf tenderness, No cyanosis, No pitting edema  Psych: Appropriate mood and affect    LABS:                        10.8   7.41  )-----------( 94       ( 24 Aug 2023 08:20 )             31.4     08-24    144  |  109  |  11  ----------------------------<  106  3.7   |  29  |  0.89    Ca    8.5      24 Aug 2023 08:20  Mg     1.5     08-23    TPro  5.0  /  Alb  2.3  /  TBili  0.5  /  DBili  0.2  /  AST  34  /  ALT  37  /  AlkPhos  49  08-23  Lipase: 43 U/L (08-22-23 @ 05:30)  PT/INR - ( 22 Aug 2023 23:23 )   PT: 13.6 sec;   INR: 1.20 ratio      PTT - ( 22 Aug 2023 23:23 )  PTT:29.2 sec  Lactate, Blood: 1.2 mmol/L (08-22 @ 23:23)  Lactate, Blood: 1.2 mmol/L (08-22 @ 08:30)  Lactate, Blood: 6.5 mmol/L (08-22 @ 05:30)    Urinalysis Basic - ( 24 Aug 2023 08:20 )    Color: x / Appearance: x / SG: x / pH: x  Gluc: 106 mg/dL / Ketone: x  / Bili: x / Urobili: x   Blood: x / Protein: x / Nitrite: x   Leuk Esterase: x / RBC: x / WBC x   Sq Epi: x / Non Sq Epi: x / Bacteria: x    Culture - Blood (collected 22 Aug 2023 23:23)  Source: .Blood Blood-Peripheral  Preliminary Report (24 Aug 2023 04:01):    No growth at 24 hours    Culture - Blood (collected 22 Aug 2023 23:23)  Source: .Blood Blood-Peripheral  Preliminary Report (24 Aug 2023 04:01):    No growth at 24 hours    Culture - Urine (collected 22 Aug 2023 06:50)  Source: Clean Catch Clean Catch (Midstream)  Final Report (23 Aug 2023 06:56):    No growth    Culture - Blood (collected 22 Aug 2023 05:30)  Source: .Blood Blood-Peripheral  Gram Stain (22 Aug 2023 22:22):    Growth in aerobic bottle: Gram Negative Rods    Growth in anaerobic bottle: Gram Negative Rods  Preliminary Report (24 Aug 2023 13:06):    Growth in aerobic and anaerobic bottles: Klebsiella oxytoca/Raoultella    ornithinolytica    Growth in aerobic bottle: Escherichia coli    See previous culture [id number]    Culture - Blood (collected 22 Aug 2023 05:30)  Source: .Blood Blood-Peripheral  Gram Stain (22 Aug 2023 22:43):    Growth in aerobic bottle: Gram Negative Rods    Growth in anaerobic bottle: Gram Negative Rods  Preliminary Report (24 Aug 2023 13:08):    Growth in aerobic bottle: Escherichia coli Multiple Morphological Strains    Growth in aerobic and anaerobic bottles: Klebsiella oxytoca/Raoultella    ornithinolytica Susceptibility to follow.    Direct identification is available within approximately 3-5    hours either by Blood Panel Multiplexed PCR or Direct    MALDI-TOF. Details: [email or web address]  Organism: Blood Culture PCR  Escherichia coli  Escherichia coli (24 Aug 2023 10:20)  Organism: Escherichia coli (24 Aug 2023 10:20)      -  Levofloxacin: S <=0.5      -  Tobramycin: S <=2      -  Aztreonam: S <=4      -  Gentamicin: S <=2      -  Cefazolin: R 8      -  Cefepime: S <=2      -  Piperacillin/Tazobactam: S <=8      -  Ciprofloxacin: S <=0.25      -  Imipenem: S <=1      -  Ceftriaxone: S <=1      -  Ampicillin: R >16 These ampicillin results predict results for amoxicillin      Method Type: MICHELLE      -  Meropenem: S <=1      -  Ampicillin/Sulbactam: I 16/8      -  Cefoxitin: S <=8      -  Amikacin: S <=16      -  Trimethoprim/Sulfamethoxazole: S <=0.5/9.5      -  Ertapenem: S <=0.5  Organism: Escherichia coli (24 Aug 2023 10:20)      -  Levofloxacin: S <=0.5      -  Tobramycin: S <=2      -  Aztreonam: S <=4      -  Gentamicin: S <=2      -  Cefazolin: S <=2      -  Cefepime: S <=2      -  Piperacillin/Tazobactam: S <=8      -  Ciprofloxacin: S <=0.25      -  Imipenem: S <=1      -  Ceftriaxone: S <=1      -  Ampicillin: S <=8 These ampicillin results predict results for amoxicillin      Method Type: MICHELLE      -  Meropenem: S <=1      -  Ampicillin/Sulbactam: S <=4/2      -  Cefoxitin: S <=8      -  Amikacin: S <=16      -  Trimethoprim/Sulfamethoxazole: S <=0.5/9.5      -  Ertapenem: S <=0.5  Organism: Blood Culture PCR (22 Aug 2023 23:21)      -  Klebsiella oxytoca: Detec      -  Escherichia coli: Detec      Method Type: PCR        RADIOLOGY & ADDITIONAL TESTS:    Care Discussed with Consultants/Other Providers:

## 2023-08-25 ENCOUNTER — TRANSCRIPTION ENCOUNTER (OUTPATIENT)
Age: 78
End: 2023-08-25

## 2023-08-25 VITALS
DIASTOLIC BLOOD PRESSURE: 67 MMHG | RESPIRATION RATE: 16 BRPM | HEART RATE: 72 BPM | TEMPERATURE: 98 F | SYSTOLIC BLOOD PRESSURE: 126 MMHG | OXYGEN SATURATION: 100 %

## 2023-08-25 LAB
-  AMIKACIN: SIGNIFICANT CHANGE UP
-  AMPICILLIN/SULBACTAM: SIGNIFICANT CHANGE UP
-  AMPICILLIN: SIGNIFICANT CHANGE UP
-  AZTREONAM: SIGNIFICANT CHANGE UP
-  CEFAZOLIN: SIGNIFICANT CHANGE UP
-  CEFEPIME: SIGNIFICANT CHANGE UP
-  CEFOXITIN: SIGNIFICANT CHANGE UP
-  CEFTRIAXONE: SIGNIFICANT CHANGE UP
-  CIPROFLOXACIN: SIGNIFICANT CHANGE UP
-  ERTAPENEM: SIGNIFICANT CHANGE UP
-  GENTAMICIN: SIGNIFICANT CHANGE UP
-  IMIPENEM: SIGNIFICANT CHANGE UP
-  LEVOFLOXACIN: SIGNIFICANT CHANGE UP
-  MEROPENEM: SIGNIFICANT CHANGE UP
-  PIPERACILLIN/TAZOBACTAM: SIGNIFICANT CHANGE UP
-  TOBRAMYCIN: SIGNIFICANT CHANGE UP
-  TRIMETHOPRIM/SULFAMETHOXAZOLE: SIGNIFICANT CHANGE UP
ALBUMIN SERPL ELPH-MCNC: 2.4 G/DL — LOW (ref 3.3–5)
ALP SERPL-CCNC: 90 U/L — SIGNIFICANT CHANGE UP (ref 40–120)
ALT FLD-CCNC: 39 U/L — SIGNIFICANT CHANGE UP (ref 10–45)
ANION GAP SERPL CALC-SCNC: 7 MMOL/L — SIGNIFICANT CHANGE UP (ref 5–17)
AST SERPL-CCNC: 30 U/L — SIGNIFICANT CHANGE UP (ref 10–40)
BASOPHILS # BLD AUTO: 0.01 K/UL — SIGNIFICANT CHANGE UP (ref 0–0.2)
BASOPHILS NFR BLD AUTO: 0.2 % — SIGNIFICANT CHANGE UP (ref 0–2)
BILIRUB SERPL-MCNC: 0.4 MG/DL — SIGNIFICANT CHANGE UP (ref 0.2–1.2)
BUN SERPL-MCNC: 9 MG/DL — SIGNIFICANT CHANGE UP (ref 7–23)
CALCIUM SERPL-MCNC: 8.4 MG/DL — SIGNIFICANT CHANGE UP (ref 8.4–10.5)
CHLORIDE SERPL-SCNC: 107 MMOL/L — SIGNIFICANT CHANGE UP (ref 96–108)
CO2 SERPL-SCNC: 30 MMOL/L — SIGNIFICANT CHANGE UP (ref 22–31)
CREAT SERPL-MCNC: 0.95 MG/DL — SIGNIFICANT CHANGE UP (ref 0.5–1.3)
CULTURE RESULTS: NO GROWTH — SIGNIFICANT CHANGE UP
CULTURE RESULTS: SIGNIFICANT CHANGE UP
CULTURE RESULTS: SIGNIFICANT CHANGE UP
EGFR: 82 ML/MIN/1.73M2 — SIGNIFICANT CHANGE UP
EOSINOPHIL # BLD AUTO: 0.05 K/UL — SIGNIFICANT CHANGE UP (ref 0–0.5)
EOSINOPHIL NFR BLD AUTO: 0.8 % — SIGNIFICANT CHANGE UP (ref 0–6)
GLUCOSE SERPL-MCNC: 100 MG/DL — HIGH (ref 70–99)
HCT VFR BLD CALC: 31.5 % — LOW (ref 39–50)
HGB BLD-MCNC: 10.8 G/DL — LOW (ref 13–17)
IMM GRANULOCYTES NFR BLD AUTO: 0.5 % — SIGNIFICANT CHANGE UP (ref 0–0.9)
LYMPHOCYTES # BLD AUTO: 1.37 K/UL — SIGNIFICANT CHANGE UP (ref 1–3.3)
LYMPHOCYTES # BLD AUTO: 21.7 % — SIGNIFICANT CHANGE UP (ref 13–44)
MAGNESIUM SERPL-MCNC: 1.9 MG/DL — SIGNIFICANT CHANGE UP (ref 1.6–2.6)
MCHC RBC-ENTMCNC: 30.9 PG — SIGNIFICANT CHANGE UP (ref 27–34)
MCHC RBC-ENTMCNC: 34.3 GM/DL — SIGNIFICANT CHANGE UP (ref 32–36)
MCV RBC AUTO: 90.3 FL — SIGNIFICANT CHANGE UP (ref 80–100)
METHOD TYPE: SIGNIFICANT CHANGE UP
MONOCYTES # BLD AUTO: 0.7 K/UL — SIGNIFICANT CHANGE UP (ref 0–0.9)
MONOCYTES NFR BLD AUTO: 11.1 % — SIGNIFICANT CHANGE UP (ref 2–14)
NEUTROPHILS # BLD AUTO: 4.16 K/UL — SIGNIFICANT CHANGE UP (ref 1.8–7.4)
NEUTROPHILS NFR BLD AUTO: 65.7 % — SIGNIFICANT CHANGE UP (ref 43–77)
NRBC # BLD: 0 /100 WBCS — SIGNIFICANT CHANGE UP (ref 0–0)
ORGANISM # SPEC MICROSCOPIC CNT: SIGNIFICANT CHANGE UP
PLATELET # BLD AUTO: 111 K/UL — LOW (ref 150–400)
POTASSIUM SERPL-MCNC: 3.6 MMOL/L — SIGNIFICANT CHANGE UP (ref 3.5–5.3)
POTASSIUM SERPL-SCNC: 3.6 MMOL/L — SIGNIFICANT CHANGE UP (ref 3.5–5.3)
PROT SERPL-MCNC: 5.9 G/DL — LOW (ref 6–8.3)
RBC # BLD: 3.49 M/UL — LOW (ref 4.2–5.8)
RBC # FLD: 13.4 % — SIGNIFICANT CHANGE UP (ref 10.3–14.5)
SODIUM SERPL-SCNC: 144 MMOL/L — SIGNIFICANT CHANGE UP (ref 135–145)
SPECIMEN SOURCE: SIGNIFICANT CHANGE UP
WBC # BLD: 6.32 K/UL — SIGNIFICANT CHANGE UP (ref 3.8–10.5)
WBC # FLD AUTO: 6.32 K/UL — SIGNIFICANT CHANGE UP (ref 3.8–10.5)

## 2023-08-25 PROCEDURE — 96365 THER/PROPH/DIAG IV INF INIT: CPT

## 2023-08-25 PROCEDURE — 99233 SBSQ HOSP IP/OBS HIGH 50: CPT

## 2023-08-25 PROCEDURE — 97161 PT EVAL LOW COMPLEX 20 MIN: CPT

## 2023-08-25 PROCEDURE — 78226 HEPATOBILIARY SYSTEM IMAGING: CPT

## 2023-08-25 PROCEDURE — 83605 ASSAY OF LACTIC ACID: CPT

## 2023-08-25 PROCEDURE — 87086 URINE CULTURE/COLONY COUNT: CPT

## 2023-08-25 PROCEDURE — 36415 COLL VENOUS BLD VENIPUNCTURE: CPT

## 2023-08-25 PROCEDURE — 80076 HEPATIC FUNCTION PANEL: CPT

## 2023-08-25 PROCEDURE — A9537: CPT

## 2023-08-25 PROCEDURE — 85730 THROMBOPLASTIN TIME PARTIAL: CPT

## 2023-08-25 PROCEDURE — 85025 COMPLETE CBC W/AUTO DIFF WBC: CPT

## 2023-08-25 PROCEDURE — 86803 HEPATITIS C AB TEST: CPT

## 2023-08-25 PROCEDURE — 80048 BASIC METABOLIC PNL TOTAL CA: CPT

## 2023-08-25 PROCEDURE — 99239 HOSP IP/OBS DSCHRG MGMT >30: CPT

## 2023-08-25 PROCEDURE — 85610 PROTHROMBIN TIME: CPT

## 2023-08-25 PROCEDURE — 74177 CT ABD & PELVIS W/CONTRAST: CPT

## 2023-08-25 PROCEDURE — 86682 HELMINTH ANTIBODY: CPT

## 2023-08-25 PROCEDURE — 81001 URINALYSIS AUTO W/SCOPE: CPT

## 2023-08-25 PROCEDURE — 93005 ELECTROCARDIOGRAM TRACING: CPT

## 2023-08-25 PROCEDURE — 80053 COMPREHEN METABOLIC PANEL: CPT

## 2023-08-25 PROCEDURE — 87040 BLOOD CULTURE FOR BACTERIA: CPT

## 2023-08-25 PROCEDURE — 85027 COMPLETE CBC AUTOMATED: CPT

## 2023-08-25 PROCEDURE — 76705 ECHO EXAM OF ABDOMEN: CPT

## 2023-08-25 PROCEDURE — 99285 EMERGENCY DEPT VISIT HI MDM: CPT

## 2023-08-25 PROCEDURE — 83735 ASSAY OF MAGNESIUM: CPT

## 2023-08-25 RX ORDER — ACETAMINOPHEN 500 MG
2 TABLET ORAL
Qty: 0 | Refills: 0 | DISCHARGE
Start: 2023-08-25

## 2023-08-25 RX ORDER — CIPROFLOXACIN LACTATE 400MG/40ML
1 VIAL (ML) INTRAVENOUS
Qty: 20 | Refills: 0
Start: 2023-08-25 | End: 2023-09-03

## 2023-08-25 RX ADMIN — Medication 650 MILLIGRAM(S): at 02:53

## 2023-08-25 RX ADMIN — Medication 650 MILLIGRAM(S): at 01:53

## 2023-08-25 RX ADMIN — CEFTRIAXONE 100 MILLIGRAM(S): 500 INJECTION, POWDER, FOR SOLUTION INTRAMUSCULAR; INTRAVENOUS at 00:16

## 2023-08-25 NOTE — PROGRESS NOTE ADULT - SUBJECTIVE AND OBJECTIVE BOX
INTERVAL HPI/OVERNIGHT EVENTS:  Patient seen and examined at bed side. He was scheduled for colonoscopy today. As per RN and patient he did not took Moviprep. He do not want our GI team to do procedure. He wants Dr Garcia to do his procedure. Informed him without colonoscopy prep colonoscopy can not be done today.   Primary team will reach out to Radha.       CT Abd/pelvis prelim reports nonspecific periportal edema, perihepatic/pericholecystic fluid and b/l perinephric stranding.    (23 Aug 2023 00:44)    MEDICATIONS  (STANDING):  aspirin enteric coated 81 milliGRAM(s) Oral daily  atorvastatin 40 milliGRAM(s) Oral at bedtime  cefTRIAXone   IVPB 2000 milliGRAM(s) IV Intermittent every 24 hours  enoxaparin Injectable 40 milliGRAM(s) SubCutaneous every 24 hours    MEDICATIONS  (PRN):  acetaminophen     Tablet .. 650 milliGRAM(s) Oral every 6 hours PRN Temp greater or equal to 38C (100.4F), Mild Pain (1 - 3)  guaiFENesin Oral Liquid (Sugar-Free) 200 milliGRAM(s) Oral every 6 hours PRN Cough  ondansetron Injectable 4 milliGRAM(s) IV Push every 6 hours PRN Nausea and/or Vomiting      Allergies    Dilaudid (Unknown)  morphine (Unknown)    Intolerances        PAST MEDICAL & SURGICAL HISTORY:  HTN (hypertension)      HLD (hyperlipidemia)      No significant past surgical history      PHYSICAL EXAM:   Vital Signs:  Vital Signs Last 24 Hrs  T(C): 36.9 (25 Aug 2023 11:46), Max: 36.9 (25 Aug 2023 11:46)  T(F): 98.5 (25 Aug 2023 11:46), Max: 98.5 (25 Aug 2023 11:46)  HR: 72 (25 Aug 2023 11:46) (70 - 80)  BP: 126/67 (25 Aug 2023 11:46) (120/68 - 133/72)  BP(mean): --  RR: 16 (25 Aug 2023 11:46) (16 - 20)  SpO2: 100% (25 Aug 2023 11:46) (95% - 100%)    Parameters below as of 25 Aug 2023 11:46  Patient On (Oxygen Delivery Method): room air      Daily     Daily I&O's Summary      GENERAL:  Appears stated age  HEENT:  NC/AT,  conjunctivae clear and pink  CHEST:  Full & symmetric excursion  HEART:  Regular rhythm, S1, S2  ABDOMEN:  Soft, non-tender, non-distended, normoactive bowel sounds  EXTEREMITIES:  no cyanosis,clubbing or edema  SKIN:  No rash/warm/dry  NEURO:  Alert, oriented    LABS:                        10.8   6.32  )-----------( 111      ( 25 Aug 2023 07:19 )             31.5     08-25    144  |  107  |  9   ----------------------------<  100<H>  3.6   |  30  |  0.95    Ca    8.4      25 Aug 2023 07:19  Mg     1.9     08-25    TPro  5.9<L>  /  Alb  2.4<L>  /  TBili  0.4  /  DBili  x   /  AST  30  /  ALT  39  /  AlkPhos  90  08-25      Urinalysis Basic - ( 25 Aug 2023 07:19 )    Color: x / Appearance: x / SG: x / pH: x  Gluc: 100 mg/dL / Ketone: x  / Bili: x / Urobili: x   Blood: x / Protein: x / Nitrite: x   Leuk Esterase: x / RBC: x / WBC x   Sq Epi: x / Non Sq Epi: x / Bacteria: x      amylase   lipaseLipase: 43 U/L (08-22 @ 05:30)    RADIOLOGY & ADDITIONAL TESTS:   Patient seen and examined at the bedside.  He was scheduled for colonoscopy today. As per RN and patient he did not take the bowel prep and advised that he did not want our GI team to do the procedure. He is insisting for Dr. Browne to perform this.     MEDICATIONS  (STANDING):  aspirin enteric coated 81 milliGRAM(s) Oral daily  atorvastatin 40 milliGRAM(s) Oral at bedtime  cefTRIAXone   IVPB 2000 milliGRAM(s) IV Intermittent every 24 hours  enoxaparin Injectable 40 milliGRAM(s) SubCutaneous every 24 hours    MEDICATIONS  (PRN):  acetaminophen     Tablet .. 650 milliGRAM(s) Oral every 6 hours PRN Temp greater or equal to 38C (100.4F), Mild Pain (1 - 3)  guaiFENesin Oral Liquid (Sugar-Free) 200 milliGRAM(s) Oral every 6 hours PRN Cough  ondansetron Injectable 4 milliGRAM(s) IV Push every 6 hours PRN Nausea and/or Vomiting      Allergies    Dilaudid (Unknown)  morphine (Unknown)    Intolerances      PHYSICAL EXAM:   Vital Signs:  Vital Signs Last 24 Hrs  T(C): 36.9 (25 Aug 2023 11:46), Max: 36.9 (25 Aug 2023 11:46)  T(F): 98.5 (25 Aug 2023 11:46), Max: 98.5 (25 Aug 2023 11:46)  HR: 72 (25 Aug 2023 11:46) (70 - 80)  BP: 126/67 (25 Aug 2023 11:46) (120/68 - 133/72)  BP(mean): --  RR: 16 (25 Aug 2023 11:46) (16 - 20)  SpO2: 100% (25 Aug 2023 11:46) (95% - 100%)    Parameters below as of 25 Aug 2023 11:46  Patient On (Oxygen Delivery Method): room air      Daily     Daily I&O's Summary      GENERAL:  Appears stated age, NAD  HEENT:  NC/AT, L eye esotropia, conjunctivae clear, anicteric sclera  CHEST:  Full & symmetric excursion  HEART:  Regular rhythm, S1, S2  ABDOMEN:  Soft, non-tender, non-distended, normoactive bowel sounds  EXTREMITIES:  no cyanosis,clubbing or edema  SKIN:  No rash or jaundice  NEURO:  Alert, oriented        LABS:                        10.8   6.32  )-----------( 111      ( 25 Aug 2023 07:19 )             31.5     08-25    144  |  107  |  9   ----------------------------<  100<H>  3.6   |  30  |  0.95    Ca    8.4      25 Aug 2023 07:19  Mg     1.9     08-25    TPro  5.9<L>  /  Alb  2.4<L>  /  TBili  0.4  /  DBili  x   /  AST  30  /  ALT  39  /  AlkPhos  90  08-25      Urinalysis Basic - ( 25 Aug 2023 07:19 )    Color: x / Appearance: x / SG: x / pH: x  Gluc: 100 mg/dL / Ketone: x  / Bili: x / Urobili: x   Blood: x / Protein: x / Nitrite: x   Leuk Esterase: x / RBC: x / WBC x   Sq Epi: x / Non Sq Epi: x / Bacteria: x

## 2023-08-25 NOTE — DIETITIAN INITIAL EVALUATION ADULT - PERTINENT MEDS FT
MEDICATIONS  (STANDING):  aspirin enteric coated 81 milliGRAM(s) Oral daily  atorvastatin 40 milliGRAM(s) Oral at bedtime  cefTRIAXone   IVPB 2000 milliGRAM(s) IV Intermittent every 24 hours  enoxaparin Injectable 40 milliGRAM(s) SubCutaneous every 24 hours    MEDICATIONS  (PRN):  acetaminophen     Tablet .. 650 milliGRAM(s) Oral every 6 hours PRN Temp greater or equal to 38C (100.4F), Mild Pain (1 - 3)  guaiFENesin Oral Liquid (Sugar-Free) 200 milliGRAM(s) Oral every 6 hours PRN Cough  ondansetron Injectable 4 milliGRAM(s) IV Push every 6 hours PRN Nausea and/or Vomiting

## 2023-08-25 NOTE — DISCHARGE NOTE NURSING/CASE MANAGEMENT/SOCIAL WORK - PATIENT PORTAL LINK FT
You can access the FollowMyHealth Patient Portal offered by [facility] by registering at the following website: [email or web address]. By joining [facility]’s FollowMyHealth portal, you will also be able to view your health information using other applications (apps) compatible with our system.

## 2023-08-25 NOTE — DISCHARGE NOTE PROVIDER - NSDCMRMEDTOKEN_GEN_ALL_CORE_FT
acetaminophen 325 mg oral tablet: 2 tab(s) orally every 6 hours As needed Temp greater or equal to 38C (100.4F), Mild Pain (1 - 3)  aspirin 81 mg oral delayed release tablet: 1 orally once a day  Cipro 500 mg oral tablet: 1 tab(s) orally 2 times a day  irbesartan-hydrochlorothiazide 150 mg-12.5 mg oral tablet: 1 orally  rosuvastatin 10 mg oral tablet: 1 tab(s) orally once a day

## 2023-08-25 NOTE — DIETITIAN INITIAL EVALUATION ADULT - NS FNS DIET ORDER
Diet, NPO after Midnight:      NPO Start Date: 24-Aug-2023,   NPO Start Time: 23:59 (08-24-23 @ 15:53)

## 2023-08-25 NOTE — DISCHARGE NOTE NURSING/CASE MANAGEMENT/SOCIAL WORK - NSDCPEFALRISK_GEN_ALL_CORE
For information on Fall & Injury Prevention, visit: [email or web address] OR  [email or web address] OR  https://www.cdc.gov/steadi/patient.html

## 2023-08-25 NOTE — PROGRESS NOTE ADULT - SUBJECTIVE AND OBJECTIVE BOX
CC: f/u for polymicrobic GNR bacteremia    Patient reports: he refuses colonoscopy, only will allow dr Browne or Susanne to do.  He reports that he had a negative colonoscopy 6 years ago    REVIEW OF SYSTEMS:  All other review of systems negative (Comprehensive ROS)    Antimicrobials Day #  :day 3  cefTRIAXone   IVPB 2000 milliGRAM(s) IV Intermittent every 24 hours    Other Medications Reviewed    T(F): 98 (08-25-23 @ 05:32), Max: 98.3 (08-24-23 @ 21:25)  HR: 70 (08-25-23 @ 05:32)  BP: 120/68 (08-25-23 @ 05:32)  RR: 18 (08-25-23 @ 05:32)  SpO2: 96% (08-25-23 @ 05:32)  Wt(kg): --    PHYSICAL EXAM:  General: alert, no acute distress  Eyes:  anicteric, no conjunctival injection, no discharge  Oropharynx: no lesions or injection 	  Neck: supple, without adenopathy  Lungs: clear to auscultation  Heart: regular rate and rhythm; no murmur, rubs or gallops  Abdomen: soft, nondistended, nontender, without mass or organomegaly  Skin: no lesions  Extremities: no clubbing, cyanosis, or edema  Neurologic: alert, oriented, moves all extremities    LAB RESULTS:                        10.8   6.32  )-----------( 111      ( 25 Aug 2023 07:19 )             31.5     08-25    144  |  107  |  9   ----------------------------<  100<H>  3.6   |  30  |  0.95    Ca    8.4      25 Aug 2023 07:19  Mg     1.9     08-25    TPro  5.9<L>  /  Alb  2.4<L>  /  TBili  0.4  /  DBili  x   /  AST  30  /  ALT  39  /  AlkPhos  90  08-25    LIVER FUNCTIONS - ( 25 Aug 2023 07:19 )  Alb: 2.4 g/dL / Pro: 5.9 g/dL / ALK PHOS: 90 U/L / ALT: 39 U/L / AST: 30 U/L / GGT: x           Urinalysis Basic - ( 25 Aug 2023 07:19 )    Color: x / Appearance: x / SG: x / pH: x  Gluc: 100 mg/dL / Ketone: x  / Bili: x / Urobili: x   Blood: x / Protein: x / Nitrite: x   Leuk Esterase: x / RBC: x / WBC x   Sq Epi: x / Non Sq Epi: x / Bacteria: x      MICROBIOLOGY:  RECENT CULTURES:  08-24 @ 06:40 Clean Catch Clean Catch (Midstream)     No growth      08-22 @ 23:23 .Blood Blood-Peripheral     No growth at 48 Hours      08-22 @ 06:50 Clean Catch Clean Catch (Midstream)     No growth      08-22 @ 05:30 .Blood Blood-Peripheral Blood Culture PCR  Escherichia coli  Escherichia coli  Klebsiella oxytoca /Raoutella ornithinolytica    Growth in aerobic bottle: Escherichia coli Multiple Morphological Strains  Growth in aerobic and anaerobic bottles: Klebsiella oxytoca/Raoultella  ornithinolytica  Direct identification is available within approximately 3-5  hours either by Blood Panel Multiplexed PCR or Direct  MALDI-TOF. Details: [email or web address]    Growth in aerobic bottle: Gram Negative Rods  Growth in anaerobic bottle: Gram Negative Rods        RADIOLOGY REVIEWED:  < from: NM Hepatobiliary Imaging (08.23.23 @ 11:19) >  IMPRESSION: Normal hepatobiliary scan.    No evidence of acute cholecystitis or biliary obstruction.    < end of copied text >  < from: CT Abdomen and Pelvis w/ IV Cont (08.23.23 @ 01:57) >  IMPRESSION:  Mild periportal edema and pericholecystic fluid are nonspecific.  Trace   fluid/edema around the proximal duodenum is nonspecific.    No CT   evidence of gallstones or dilated bile ducts.  Follow-up right upper   quadrant ultrasound may be obtained for further evaluation.  Duodenitis   or peptic ulcer disease should be excluded clinically.    No evidence of bowel obstruction, gastrointestinal perforation or   abscess/drainable fluid collection.    Nonspecific perinephric fat stranding/free fluid bilaterally.  No   hydronephrosis or renal stones.    The appendix is upper limits of normal in size; no secondary signs of   appendicitis.    Colonic diverticulosis without evidence for acute diverticulitis.    Trace pleural effusions bilaterally.    Trace perihepatic and perisplenic ascites.    < end of copied text >

## 2023-08-25 NOTE — DISCHARGE NOTE PROVIDER - ATTENDING DISCHARGE PHYSICAL EXAMINATION:
General: NAD, A/O x 3  ENT: No gross hearing impairment, Moist mucous membranes, no thrush  Neck: Supple, No JVD  Lungs: Clear to auscultation bilaterally, good air entry, non-labored breathing  Cardio: RRR, S1/S2, No murmur  Abdomen: Soft, Nontender, Nondistended; Bowel sounds present  Extremities: No calf tenderness, No cyanosis, No pitting edema  Psych: Appropriate mood and affect

## 2023-08-25 NOTE — PROGRESS NOTE ADULT - ASSESSMENT
77-year-old male with past medical history hypertension hyperlipidemia, he was in ER for fever chills 2 days before discharged after Antibiotics.    Pt was recalled to go back to ED blood  cultures resulted positive 4/4 for gram neg rods.  G I Consult for Bacteremia , suspect GI source.   Colonoscopy canceled today as he did not took colonoscopy prep.   Patient want to F/U with Dr Garcia for the colonoscopy. Primary team aware.   Continue current management. We will sign off, please reconsult as needed .  77-year-old male with past medical history hypertension hyperlipidemia, recent ED evaluation for fever chills 2 days before, discharged after Antibiotics.    Pt was recalled back to the ED after blood cultures resulted positive 4/4 for gram neg rods.  GI Consulted for Bacteremia, possible GI source.

## 2023-08-25 NOTE — PROGRESS NOTE ADULT - REASON FOR ADMISSION
gram neg bacteremia - sepsis

## 2023-08-25 NOTE — DISCHARGE NOTE PROVIDER - NSDCCPCAREPLAN_GEN_ALL_CORE_FT
PRINCIPAL DISCHARGE DIAGNOSIS  Diagnosis: Bacteremia  Assessment and Plan of Treatment: you were advised to come back to the hospital due to positive blood cultures which are growing 2 different bacteria. Unfortunately we do not have a source for the infection. You were seen by infectious disease specialist as well GI who deemed beneficial for you to undergo colonoscopy for further workup. Since you prefer to have it done by your colorectal surgeon, Dr. Browne, you are stable to be discharged and have follow up with him as outpatient and complete course of abx

## 2023-08-25 NOTE — PROGRESS NOTE ADULT - PROBLEM SELECTOR PLAN 1
Reviewed CT scan and HIDA scan  Normal LFTs, Bilirubin  MRCP to further checking the Biliary source  Colonoscopy to be discussed as inpatient or out patient Reviewed CT scan and HIDA scan  Normal LFTs, Bilirubin  Consider MRCP to further evaluate for a biliary source    The colonoscopy was cancelled today as he refused to take the colonoscopy prep overnight. Patient is insisting Dr. Browne perform the colonoscopy. We advised him that without the colonoscopy prep the procedure could not be done today regardless. Primary team aware.   Continue current management.   GI will sign off. Please reconsult if our services are needed. Thank you.

## 2023-08-25 NOTE — DIETITIAN INITIAL EVALUATION ADULT - PERTINENT LABORATORY DATA
08-25    144  |  107  |  9   ----------------------------<  100<H>  3.6   |  30  |  0.95    Ca    8.4      25 Aug 2023 07:19  Mg     1.9     08-25    TPro  5.9<L>  /  Alb  2.4<L>  /  TBili  0.4  /  DBili  x   /  AST  30  /  ALT  39  /  AlkPhos  90  08-25

## 2023-08-25 NOTE — DISCHARGE NOTE PROVIDER - CARE PROVIDER_API CALL
Angel Keating  Internal Medicine  [address] [facility] [facility], [address]  [place] [address]  Phone: [phone]  Fax: [phone]  Established Patient  Follow Up Time: 1-3 days

## 2023-08-25 NOTE — PROGRESS NOTE ADULT - NS ATTEND AMEND GEN_ALL_CORE FT
Pt seen and examined at bedside.  No acute events overnight  Pt denies cp, palpitations, sob, abd pain, N/V, fever, chills  Pt w/ E coli + Klebsiella Bacteremia   Culture results noted  Discussed w/ ID and GI   Plan for Colonoscopy tomorrow  NPO after MN  Continue Ceftriaxone
Patient seen and examined at the bedside. Agree with the assessment and plan of NP Chato.  Patient declines our services.  Management as above.  Will sign off. Please reconsult as needed. Thank you.

## 2023-08-25 NOTE — DIETITIAN INITIAL EVALUATION ADULT - OTHER INFO
77-year-old male with past medical history hypertension hyperlipidemia presents  to the ED with complaints of tactile fever chills, patient NPO was  noted for ? colonoscopy , Patient noted refused GI prep , Skin  inact, NKFA noted No recent weight changes reported m reports he consumes a " healthy " diet at home. Encouraged low sodium , low cholestanol due to  PMH , patient requesting food if Hollie Santiago cannot do test.

## 2023-08-25 NOTE — PROGRESS NOTE ADULT - ASSESSMENT
Patient is a 77y male witha past history of HTN and HLD who was evaluated in the ER yesterday with fever to 102,rigors and a leukocytosis, sent home, and than called back when his blood cultures were growing GNR in both sets.  He had a similar event in March of 2023, had negative blood and urine cultures and was not hospitalized.  He denies any  symptoms  and he also denies any a He has received CTX and gent earlier today.  Polymicrobic bacteremia in setting or rigors and no localizing complaints.  He had a similar event in march although bacteremia was not documented.  He was feeling better prior to antibiotics.Cryptogenic GI focus for bacteremia.His LFT's are normal, they were minimally elevated yesterday. His UA is benign.CT scan, RUQ sono, and HIDA have not identified the sotce of bacteremia  His repeat blood cultures are negative.He is refusing additional in hospital evaluation at this time  Suggest:  1 Can switch to po cipro 500 BID x 7 additional days, this will complete 10 days treatment for polymicrobic GNR bacteremia of cryptogenic focus  2 He should have OPD GI or surgical FU for colonoscopy  3. He should have LFT's repeated as OPD as well, low threshold for additional imaging  4 Case reviewed with Dr Benavides. With patient afebrile, bacteremia controlled, and exam benign I think he can be discharged but he will need to return to hospital if he has additional fever

## 2023-08-25 NOTE — DISCHARGE NOTE PROVIDER - HOSPITAL COURSE
Hospital Course  78 y/o M with PMH HTN, HLD c/o fever, chills x 2 days presented to ER, received cftx x1 with improvement, discharged home and referred back for evaluation due to blood cultures positive 4/4 for gram neg rods; admitted for E coli and Klebsiella bacteremia, unknown source.  and GI workup were negative. Recommended Colonoscopy, however, pt preferred to undergo procedure with Dr. Browne, his colorectal surgeon. Pt evaluated by ID w/ recommendations for 10 days of Cipro and outpatient followup     Source of Infection: Bacteremia  Antibiotic / Last Day: Cipro 500mg BID x 10 days    Discharging Provider:  Albaro Benavides MD  Contact Info: [phone] - Please call with any questions or concerns.    Outpatient Provider: Dr. Keating

## 2023-08-26 NOTE — CHART NOTE - NSCHARTNOTEFT_GEN_A_CORE
SW called pt to discuss and assist with follow up care.  Pt is a 78 y/o male presented to ED fo chills.  Pt reports  that pt is feeling better and has scheduled primary care appt with Jacqueline Beltre on 8/30/23.  Worker did not identify any other concerns and encouraged to call if further assistance is needed.

## 2023-08-29 LAB — STRONGYLOIDES AB SER-ACNC: NEGATIVE — SIGNIFICANT CHANGE UP
